# Patient Record
Sex: FEMALE | Race: WHITE | NOT HISPANIC OR LATINO | Employment: FULL TIME | URBAN - METROPOLITAN AREA
[De-identification: names, ages, dates, MRNs, and addresses within clinical notes are randomized per-mention and may not be internally consistent; named-entity substitution may affect disease eponyms.]

---

## 2017-05-11 ENCOUNTER — HOSPITAL ENCOUNTER (EMERGENCY)
Facility: CLINIC | Age: 22
Discharge: HOME | End: 2017-05-11
Attending: EMERGENCY MEDICINE | Admitting: EMERGENCY MEDICINE
Payer: COMMERCIAL

## 2017-05-11 VITALS
WEIGHT: 180 LBS | DIASTOLIC BLOOD PRESSURE: 63 MMHG | TEMPERATURE: 98.7 F | BODY MASS INDEX: 30.73 KG/M2 | HEIGHT: 64 IN | RESPIRATION RATE: 14 BRPM | OXYGEN SATURATION: 97 % | SYSTOLIC BLOOD PRESSURE: 132 MMHG

## 2017-05-11 DIAGNOSIS — S70.12XA TRAUMATIC ECCHYMOSIS OF LEFT THIGH, INITIAL ENCOUNTER: ICD-10-CM
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
## 2017-05-11 DIAGNOSIS — R58 MULTIPLE ECCHYMOSES OF BOTH UPPER ARMS: ICD-10-CM

## 2017-05-11 PROCEDURE — 99282 EMERGENCY DEPT VISIT SF MDM: CPT

## 2017-05-11 NOTE — ED AVS SNAPSHOT
Emergency Department    [address] [facility] [address]    Phone:  [phone]    Fax:  [phone]                                       Liz Agosto   MRN: [id number]    Department:   Emergency Department   Date of Visit:  5/11/2017           After Visit Summary Signature Page     I have received my discharge instructions, and my questions have been answered. I have discussed any challenges I see with this plan with the nurse or doctor.    ..........................................................................................................................................  Patient/Patient Representative Signature      ..........................................................................................................................................  Patient Representative Print Name and Relationship to Patient    ..................................................               ................................................  Date                                            Time    ..........................................................................................................................................  Reviewed by Signature/Title    ...................................................              ..............................................  Date                                                            Time
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
## 2017-05-12 NOTE — DISCHARGE INSTRUCTIONS
Avoid all contact sports or activities that could put you at risk for another head injury for at least 1 week.
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
## 2019-12-20 ENCOUNTER — APPOINTMENT (OUTPATIENT)
Dept: ULTRASOUND IMAGING | Facility: CLINIC | Age: 24
End: 2019-12-20
Attending: EMERGENCY MEDICINE
Payer: MEDICAID

## 2019-12-20 ENCOUNTER — HOSPITAL ENCOUNTER (EMERGENCY)
Facility: CLINIC | Age: 24
Discharge: HOME | End: 2019-12-20
Attending: EMERGENCY MEDICINE | Admitting: EMERGENCY MEDICINE
Payer: MEDICAID

## 2019-12-20 VITALS
OXYGEN SATURATION: 95 % | TEMPERATURE: 98.4 F | WEIGHT: 143 LBS | RESPIRATION RATE: 18 BRPM | BODY MASS INDEX: 23.82 KG/M2 | DIASTOLIC BLOOD PRESSURE: 55 MMHG | SYSTOLIC BLOOD PRESSURE: 116 MMHG | HEIGHT: 65 IN | HEART RATE: 69 BPM

## 2019-12-20 DIAGNOSIS — R42 DIZZINESS: ICD-10-CM

## 2019-12-20 LAB
ALBUMIN SERPL-MCNC: 3.9 G/DL (ref 3.4–5)
ALBUMIN UR-MCNC: NEGATIVE MG/DL
ALP SERPL-CCNC: 46 U/L (ref 40–150)
ALT SERPL W P-5'-P-CCNC: 15 U/L (ref 0–50)
ANION GAP SERPL CALCULATED.3IONS-SCNC: 5 MMOL/L (ref 3–14)
APPEARANCE UR: CLEAR
AST SERPL W P-5'-P-CCNC: 14 U/L (ref 0–45)
B-HCG SERPL-ACNC: ABNORMAL IU/L (ref 0–5)
BASOPHILS # BLD AUTO: 0 10E9/L (ref 0–0.2)
BASOPHILS NFR BLD AUTO: 0.3 %
BILIRUB SERPL-MCNC: 0.1 MG/DL (ref 0.2–1.3)
BILIRUB UR QL STRIP: NEGATIVE
BUN SERPL-MCNC: 7 MG/DL (ref 7–30)
CALCIUM SERPL-MCNC: 9.8 MG/DL (ref 8.5–10.1)
CHLORIDE SERPL-SCNC: 104 MMOL/L (ref 94–109)
CO2 SERPL-SCNC: 28 MMOL/L (ref 20–32)
COLOR UR AUTO: NORMAL
CREAT SERPL-MCNC: 0.67 MG/DL (ref 0.52–1.04)
DIFFERENTIAL METHOD BLD: ABNORMAL
EOSINOPHIL # BLD AUTO: 0 10E9/L (ref 0–0.7)
EOSINOPHIL NFR BLD AUTO: 0.4 %
ERYTHROCYTE [DISTWIDTH] IN BLOOD BY AUTOMATED COUNT: 12.2 % (ref 10–15)
GFR SERPL CREATININE-BSD FRML MDRD: >90 ML/MIN/{1.73_M2}
GLUCOSE SERPL-MCNC: 70 MG/DL (ref 70–99)
GLUCOSE UR STRIP-MCNC: NEGATIVE MG/DL
HCT VFR BLD AUTO: 34.8 % (ref 35–47)
HGB BLD-MCNC: 11.8 G/DL (ref 11.7–15.7)
HGB UR QL STRIP: NEGATIVE
IMM GRANULOCYTES # BLD: 0 10E9/L (ref 0–0.4)
IMM GRANULOCYTES NFR BLD: 0.1 %
INTERPRETATION ECG - MUSE: NORMAL
KETONES UR STRIP-MCNC: NEGATIVE MG/DL
LEUKOCYTE ESTERASE UR QL STRIP: NEGATIVE
LYMPHOCYTES # BLD AUTO: 1.7 10E9/L (ref 0.8–5.3)
LYMPHOCYTES NFR BLD AUTO: 24.4 %
MCH RBC QN AUTO: 30.6 PG (ref 26.5–33)
MCHC RBC AUTO-ENTMCNC: 33.9 G/DL (ref 31.5–36.5)
MCV RBC AUTO: 90 FL (ref 78–100)
MONOCYTES # BLD AUTO: 0.5 10E9/L (ref 0–1.3)
MONOCYTES NFR BLD AUTO: 7.6 %
NEUTROPHILS # BLD AUTO: 4.8 10E9/L (ref 1.6–8.3)
NEUTROPHILS NFR BLD AUTO: 67.2 %
NITRATE UR QL: NEGATIVE
NRBC # BLD AUTO: 0 10*3/UL
NRBC BLD AUTO-RTO: 0 /100
PH UR STRIP: 7 PH (ref 5–7)
PLATELET # BLD AUTO: 187 10E9/L (ref 150–450)
POTASSIUM SERPL-SCNC: 3.5 MMOL/L (ref 3.4–5.3)
PROT SERPL-MCNC: 7.4 G/DL (ref 6.8–8.8)
RBC # BLD AUTO: 3.86 10E12/L (ref 3.8–5.2)
SODIUM SERPL-SCNC: 137 MMOL/L (ref 133–144)
SOURCE: NORMAL
SP GR UR STRIP: 1 (ref 1–1.03)
UROBILINOGEN UR STRIP-MCNC: NORMAL MG/DL (ref 0–2)
WBC # BLD AUTO: 7.1 10E9/L (ref 4–11)

## 2019-12-20 PROCEDURE — 76801 OB US < 14 WKS SINGLE FETUS: CPT

## 2019-12-20 PROCEDURE — 85025 COMPLETE CBC W/AUTO DIFF WBC: CPT | Performed by: EMERGENCY MEDICINE

## 2019-12-20 PROCEDURE — 96360 HYDRATION IV INFUSION INIT: CPT

## 2019-12-20 PROCEDURE — 96361 HYDRATE IV INFUSION ADD-ON: CPT

## 2019-12-20 PROCEDURE — 84702 CHORIONIC GONADOTROPIN TEST: CPT | Performed by: EMERGENCY MEDICINE

## 2019-12-20 PROCEDURE — 25800030 ZZH RX IP 258 OP 636: Performed by: EMERGENCY MEDICINE

## 2019-12-20 PROCEDURE — 81003 URINALYSIS AUTO W/O SCOPE: CPT | Performed by: EMERGENCY MEDICINE

## 2019-12-20 PROCEDURE — 99285 EMERGENCY DEPT VISIT HI MDM: CPT | Mod: 25

## 2019-12-20 PROCEDURE — 80053 COMPREHEN METABOLIC PANEL: CPT | Performed by: EMERGENCY MEDICINE

## 2019-12-20 PROCEDURE — 93005 ELECTROCARDIOGRAM TRACING: CPT

## 2019-12-20 RX ADMIN — SODIUM CHLORIDE 1000 ML: 9 INJECTION, SOLUTION INTRAVENOUS at 18:47

## 2019-12-20 ASSESSMENT — ENCOUNTER SYMPTOMS
HEADACHES: 1
WEAKNESS: 1
ABDOMINAL PAIN: 0
VOMITING: 1
NUMBNESS: 0

## 2019-12-20 ASSESSMENT — MIFFLIN-ST. JEOR: SCORE: 1409.52

## 2019-12-20 NOTE — ED AVS SNAPSHOT
Emergency Department  [address] [place] [address]  Phone:  [phone]  Fax:  [phone]                                    Liz Agosto   MRN: [id number]    Department:   Emergency Department   Date of Visit:  12/20/2019           After Visit Summary Signature Page    I have received my discharge instructions, and my questions have been answered. I have discussed any challenges I see with this plan with the nurse or doctor.    ..........................................................................................................................................  Patient/Patient Representative Signature      ..........................................................................................................................................  Patient Representative Print Name and Relationship to Patient    ..................................................               ................................................  Date                                   Time    ..........................................................................................................................................  Reviewed by Signature/Title    ...................................................              ..............................................  Date                                               Time          22EPIC Rev 08/18

## 2019-12-20 NOTE — ED TRIAGE NOTES
Passed out approx 2 weeks ago in [place] while visiting family. 12 weeks pregnant. No other passing out episodes since being back but feels like going to pass out. Headache.

## 2019-12-21 NOTE — ED PROVIDER NOTES
History   Chief Complaint:  Lightheadedness     HPI   Liz Agosto is a 12-week pregnant, otherwise healthy  24 year old female who presents for evaluation of lightheadedness. The patient reports that two weeks ago while high in the [facility] she had a syncopal episode lasting 30-60 seconds. This episode did not have any associated shaking or seizure-like activity. She notes that at that time she had been exercising more but not drinking enough water or eating enough food. The patient has had several syncopal episodes in the past. Since the episode, the patient attests to feeling lightheaded and generally weak. These symptoms are exacerbated by standing at work and she has had to sit down more often. She has not had any additional episodes of syncope but is concerned due to the persisting faint feeling, prompting her evaluation this evening. In the ED, the patient further endorses feeling chilled yesterday, headaches, increased time sleeping, and one episode of vomiting daily. No abdominal pain or vaginal bleeding. She has not tried any medications for the symptoms but has been drinking plenty of water. The patient is currently pregnant and had an ultrasound performed four days ago. She has OB follow-up planned for the near future. The patient denies any numbness/tingling, abdominal pain, and vaginal bleeding.    Allergies:  No known drug allergies    Medications:   Prenatal vitamins    Past Medical History:    Depression with anxiety    Past Surgical History:    [place] teeth extraction    Family History:    History reviewed. No pertinent family history.     Social History:  Smoking status: Never smoker  Alcohol use: No    Review of Systems   Gastrointestinal: Positive for vomiting. Negative for abdominal pain.   Genitourinary: Negative for vaginal bleeding.   Neurological: Positive for syncope, weakness (generalized) and headaches. Negative for numbness.   All other systems reviewed and are  "negative.      Physical Exam   Patient Vitals for the past 24 hrs:   BP Temp Temp src Pulse Heart Rate Resp SpO2 Height Weight   12/20/19 1744 -- -- -- -- -- -- 95 % -- --   12/20/19 1733 116/55 98.4  F (36.9  C) Oral 69 69 18 -- 1.651 m (5' 5\") 64.9 kg (143 lb)     Physical Exam  General: Well appearing, nontoxic. Resting comfortably  Head:  Scalp, face, and head appear normal  Eyes:  Pupils are equal, round, and reactive to light, EOMI, no nystagmus     Conjunctivae non-injected and sclerae white  ENT:    The external nose is normal    Pinnae are normal    The oropharynx is normal, mucous membranes moist    Uvula is in the midline  Neck:  Normal range of motion    There is no rigidity noted    Trachea is in the midline  CV:  Regular rate and rhythm     Normal S1/S2, no S3/S4    No murmur or rub. Radial pulses 2+ bilaterally   Resp:  Lungs are clear and equal bilaterally    There is no tachypnea    No increased work of breathing    No rales, wheezing, or rhonchi  GI:  Abdomen is soft, no rigidity or guarding    No distension, or mass. Gravid uterus nonpalpable.    No tenderness or rebound tenderness   MS:  Normal muscular tone    Symmetric motor strength    No lower extremity edema  Skin:  No rash or acute skin lesions noted  Neuro: Awake and alert    Speech is normal and fluent    Moves all extremities spontaneously  Psych:  Normal affect.  Appropriate interactions.    Emergency Department Course   ECG (17:51:07):  Rate 76 bpm. VT interval 154. QRS duration 80. QT/QTc 380/427. P-R-T axes 66 72 48. Normal sinus rhythm with sinus arrhythmia. Possible Left atrial enlargement. Interpreted at 1903 by Meir Gabriel MD.    Laboratory:  Laboratory findings were communicated with the patient who voiced understanding of the findings.    CBC: WNL (WBC 7.1, HGB 11.8, )  CMP: Bilirubin 0.1 (L) o/w WNL (Creatinine 0.67)  hCG Quant: 40,062 (H)    UA reflex to Microscopic and Culture: negative    Interventions:  1847 NS " 1L IV Bolus    Emergency Department Course:  IV inserted and blood drawn. EKG obtained, results above.    Past medical records, nursing notes, and vitals reviewed.   1845 I performed an exam of the patient and obtained history, as documented above.    2103 I rechecked the patient. Explained findings to the patient.    Findings and plan explained to the patient. Patient discharged home with instructions regarding supportive care, medications, and reasons to return. The importance of close follow-up was reviewed.     Impression & Plan    Medical Decision Making:  Liz Agosto is a 24 year old female G1, P0 currently 12 weeks pregnant who presents with lightheadedness and history of syncope 2 weeks ago.  On my evaluation patient is well-appearing hemodynamically stable and afebrile.  She has no symptoms to suggest a primary infectious etiology.  EKG was obtained and was unremarkable.  No evidence of dysrhythmia or acute ischemia.  No evidence of right heart strain to suggest pulmonary embolism.  Patient is not tachycardic nor hypoxic which makes PE less likely.  Other etiologies related to her pregnancy are also considered including ectopic pregnancy, hemorrhage, underlying metabolic disturbance.  Patient notes she is having some difficulty with nausea and vomiting of pregnancy with daily vomiting.  Dehydration and poor oral intake is also considered as likely primary etiology.  Work-up in the emergency department is thankfully reassuring.  OB ultrasound reveals normal pregnancy without any concerning findings.  Urinalysis is negative for infection and ketones.  There is no anemia or significant electrolyte disturbance.  Patient felt improved after IV fluids.  She had no concerning findings to indicate the need for hospitalization.  I recommended discharged home with supportive care and good oral intake.  I recommended doxylamine/Unisom to help with her nausea and vomiting I also recommended close OB/GYN follow-up of  pregnancy.  Patient was agreeable to plan of care close return precautions were provided and patient was discharged in stable condition.    Diagnosis:    ICD-10-CM   1. Dizziness R42        Disposition:  Discharged to home    Discharge Medications:     Started    doxylamine 25 MG Tabs tablet  Commonly known as:  Unisom  Take 0.5 tab in the morning and 1 tab by mouth at bedtime as needed for nausea/vomiting of pregnancy.             12/20/2019   Rickie Maradiaga, am serving as a scribe at 6:45 PM on 12/20/2019 to document services personally performed by Meir Gabriel MD based on my observations and the provider's statements to me.      Meir Gabriel MD  12/21/19 1100

## 2019-12-22 ENCOUNTER — HOSPITAL ENCOUNTER (EMERGENCY)
Facility: CLINIC | Age: 24
Discharge: HOME | End: 2019-12-22
Attending: EMERGENCY MEDICINE | Admitting: EMERGENCY MEDICINE
Payer: MEDICAID

## 2019-12-22 ENCOUNTER — APPOINTMENT (OUTPATIENT)
Dept: ULTRASOUND IMAGING | Facility: CLINIC | Age: 24
End: 2019-12-22
Attending: EMERGENCY MEDICINE
Payer: MEDICAID

## 2019-12-22 VITALS
TEMPERATURE: 97.8 F | WEIGHT: 148 LBS | HEIGHT: 65 IN | RESPIRATION RATE: 18 BRPM | BODY MASS INDEX: 24.66 KG/M2 | HEART RATE: 69 BPM | DIASTOLIC BLOOD PRESSURE: 49 MMHG | OXYGEN SATURATION: 93 % | SYSTOLIC BLOOD PRESSURE: 95 MMHG

## 2019-12-22 DIAGNOSIS — O46.90 VAGINAL BLEEDING IN PREGNANCY: ICD-10-CM

## 2019-12-22 LAB
ABO + RH BLD: NORMAL
ABO + RH BLD: NORMAL
B-HCG FREE SERPL-ACNC: >2000 IU/L
B-HCG SERPL-ACNC: ABNORMAL IU/L (ref 0–5)
BASOPHILS # BLD AUTO: 0 10E9/L (ref 0–0.2)
BASOPHILS NFR BLD AUTO: 0.3 %
BLD GP AB SCN SERPL QL: NORMAL
BLOOD BANK CMNT PATIENT-IMP: NORMAL
DIFFERENTIAL METHOD BLD: ABNORMAL
EOSINOPHIL # BLD AUTO: 0 10E9/L (ref 0–0.7)
EOSINOPHIL NFR BLD AUTO: 0.4 %
ERYTHROCYTE [DISTWIDTH] IN BLOOD BY AUTOMATED COUNT: 12.2 % (ref 10–15)
HCT VFR BLD AUTO: 33.6 % (ref 35–47)
HGB BLD-MCNC: 11.3 G/DL (ref 11.7–15.7)
IMM GRANULOCYTES # BLD: 0 10E9/L (ref 0–0.4)
IMM GRANULOCYTES NFR BLD: 0.1 %
LYMPHOCYTES # BLD AUTO: 1.3 10E9/L (ref 0.8–5.3)
LYMPHOCYTES NFR BLD AUTO: 18.5 %
MCH RBC QN AUTO: 30.1 PG (ref 26.5–33)
MCHC RBC AUTO-ENTMCNC: 33.6 G/DL (ref 31.5–36.5)
MCV RBC AUTO: 90 FL (ref 78–100)
MONOCYTES # BLD AUTO: 0.6 10E9/L (ref 0–1.3)
MONOCYTES NFR BLD AUTO: 8.5 %
NEUTROPHILS # BLD AUTO: 4.9 10E9/L (ref 1.6–8.3)
NEUTROPHILS NFR BLD AUTO: 72.2 %
PLATELET # BLD AUTO: 174 10E9/L (ref 150–450)
RBC # BLD AUTO: 3.75 10E12/L (ref 3.8–5.2)
SPECIMEN EXP DATE BLD: NORMAL
WBC # BLD AUTO: 6.8 10E9/L (ref 4–11)

## 2019-12-22 PROCEDURE — 86901 BLOOD TYPING SEROLOGIC RH(D): CPT | Performed by: EMERGENCY MEDICINE

## 2019-12-22 PROCEDURE — 85025 COMPLETE CBC W/AUTO DIFF WBC: CPT | Performed by: EMERGENCY MEDICINE

## 2019-12-22 PROCEDURE — 84702 CHORIONIC GONADOTROPIN TEST: CPT

## 2019-12-22 PROCEDURE — 76801 OB US < 14 WKS SINGLE FETUS: CPT

## 2019-12-22 PROCEDURE — 86900 BLOOD TYPING SEROLOGIC ABO: CPT | Performed by: EMERGENCY MEDICINE

## 2019-12-22 PROCEDURE — 99284 EMERGENCY DEPT VISIT MOD MDM: CPT | Mod: 25

## 2019-12-22 PROCEDURE — 86850 RBC ANTIBODY SCREEN: CPT | Performed by: EMERGENCY MEDICINE

## 2019-12-22 PROCEDURE — 84702 CHORIONIC GONADOTROPIN TEST: CPT | Performed by: EMERGENCY MEDICINE

## 2019-12-22 ASSESSMENT — MIFFLIN-ST. JEOR: SCORE: 1432.2

## 2019-12-22 NOTE — ED PROVIDER NOTES
"  History     Chief Complaint:    Vaginal Bleeding      HPI   Liz Agosto is a 24 year old female who presents with vaginal bleeding. Patient states that she was here two days ago because her friends were worried about her after she hit her head. She states that she is currently pregnant, and two days ago she was told that everything looks okay. However, she is currently worried because, this morning, she started bleeding a lot. She notes that she doesn't feel intense cramping but states that something isn't right. She states that currently, the blood is a lot heavier than a period and soaks through pads. Furthermore, she states that she had prior bleeding when they inserted the vaginal probe for an ultrasound upon her initial pregnancy diagnosis. She notes that she just set up with an OB, and has an appointment in January. She denies experiencing any new trauma in the last couple days or dysuria. She also denies taking any medications. Of note, she states that her parents had a lot of miscarriages before her.     Allergies:  The patient has no known drug allergies.    Medications:    Lutera  Lexapro    Past Medical History:    Depression  Anxiety    Past Surgical History:    [place] teeth removal    Family History:    No past pertinent family history.    Social History:  The patient denies tobacco or alcohol use.    Marital Status:  Single [1]    Review of Systems   Genitourinary: Positive for vaginal bleeding.   All other systems reviewed and are negative.        Physical Exam   First Vitals:  BP: 117/55  Pulse: 77  Temp: 97.8  F (36.6  C)  Resp: 16  Height: 165.1 cm (5' 5\")  Weight: 67.1 kg (148 lb)  SpO2: 98 %      Physical Exam  General: Patient is alert and normal appearing.  HEENT: Head atraumatic    Eyes: pupils equal and reactive. Conjunctiva clear   Nares: patent   Oropharynx: no lesions, uvula midline, no palatal draping, normal voice, no trismus  Neck: Supple without lymphadenopathy, no " meningismus  Chest: Heart regular rate and rhythm.   Lungs: Equal clear to auscultation with no wheeze or rales  Abdomen: Soft, non tender, nondistended, normal bowel sounds  Back: No costovertebral angle tenderness, no midline C, T or L spine tenderness  Neuro: Grossly nonfocal, normal speech, strength equal bilaterally, CN 2-12 intact  Extremities: No deformities, equal radial and DP pulses. No clubbing, cyanosis.  No edema  Skin: Warm and dry with no rash.       Emergency Department Course     Imaging:  US OB, <14 weeks single:  IMPRESSION: Early single live intrauterine pregnancy of approximately  14 weeks 1 day gestation. as per radiology.     Laboratory:  CBC: WBC: 6.8, HGB: 11.3(L), PLT: 174    ABO/Rh type: A Pos, Antibody Screen: Neg    HCG Quantitative: 33,701 (H)    ISTAT HCG Quantitative: >2,000.0 (H)    Emergency Department Course:  Nursing notes and vitals reviewed. (4512) I performed an exam of the patient as documented above.     IV inserted. Blood drawn. This was sent to the lab for further testing, results above.     The patient was sent for a US OB, <14 weeks single while in the emergency department, findings above.     1505 I rechecked the patient and discussed the results of her workup thus far.     Findings and plan explained to the Patient. Patient discharged home with instructions regarding supportive care, medications, and reasons to return. The importance of close follow-up was reviewed.     I personally reviewed the laboratory results with the Patient and answered all related questions prior to discharge.     Impression & Plan      Medical Decision Making:  Liz Agosto is a 24 year old female who presents for evaluation of vaginal bleeding.  The workup here shows threatened miscarriage.  There is not a subchorionic hemorrhage.   I considered a broad differential including ovarian cyst, UTI, pyelonephritis, subchorionic hemorrhage, uterine bleeding, active miscarriage, constipation, etc.   More rare but serious etiologies considered included ectopic pregnancy, appendicitis, cholecystitis, volvulus, intraabdominal abscess, heterotopic pregnancy, etc.  In this patient, there are no signs of serious etiologies of abdominal pain.  Supportive outpatient management is therefore indicated.  Plan is home, close follow-up with OB, threatened miscarriage precautions, and return to ED for worsening pain, heavy vaginal bleeding (more than 1 pad soaked every hour).  Questions were answered.        Diagnosis:    ICD-10-CM    1. Vaginal bleeding in pregnancy O46.90        Disposition:  discharged to home    Discharge Medications:  New Prescriptions    No medications on file   Scribe Disclosure:  Galileo TRAORE, am serving as a scribe on 12/22/2019 at 2:02 PM to personally document services performed by Angela Toscano MD based on my observations and the provider's statements to me.     Galileo Elias  12/22/2019    EMERGENCY DEPARTMENT       Angela Toscano MD  12/22/19 [id number]

## 2019-12-22 NOTE — ED AVS SNAPSHOT
Emergency Department  [address] [place] [id number]  Phone:  [phone]  Fax:  [phone]                                    Liz Agosto   MRN: [id number]    Department:   Emergency Department   Date of Visit:  12/22/2019           After Visit Summary Signature Page    I have received my discharge instructions, and my questions have been answered. I have discussed any challenges I see with this plan with the nurse or doctor.    ..........................................................................................................................................  Patient/Patient Representative Signature      ..........................................................................................................................................  Patient Representative Print Name and Relationship to Patient    ..................................................               ................................................  Date                                   Time    ..........................................................................................................................................  Reviewed by Signature/Title    ...................................................              ..............................................  Date                                               Time          22EPIC Rev 08/18

## 2020-01-14 ENCOUNTER — PRENATAL OFFICE VISIT (OUTPATIENT)
Dept: OBGYN | Facility: CLINIC | Age: 25
End: 2020-01-14
Payer: MEDICAID

## 2020-01-14 ENCOUNTER — ANCILLARY PROCEDURE (OUTPATIENT)
Dept: ULTRASOUND IMAGING | Facility: CLINIC | Age: 25
End: 2020-01-14
Attending: ADVANCED PRACTICE MIDWIFE
Payer: MEDICAID

## 2020-01-14 ENCOUNTER — PRENATAL OFFICE VISIT (OUTPATIENT)
Dept: MIDWIFE SERVICES | Facility: CLINIC | Age: 25
End: 2020-01-14
Attending: ADVANCED PRACTICE MIDWIFE
Payer: MEDICAID

## 2020-01-14 VITALS — DIASTOLIC BLOOD PRESSURE: 58 MMHG | BODY MASS INDEX: 24.13 KG/M2 | SYSTOLIC BLOOD PRESSURE: 114 MMHG | WEIGHT: 145 LBS

## 2020-01-14 VITALS
SYSTOLIC BLOOD PRESSURE: 114 MMHG | WEIGHT: 148 LBS | TEMPERATURE: 98 F | DIASTOLIC BLOOD PRESSURE: 67 MMHG | BODY MASS INDEX: 24.63 KG/M2 | HEART RATE: 86 BPM

## 2020-01-14 DIAGNOSIS — O46.92 VAGINAL BLEEDING IN PREGNANCY, SECOND TRIMESTER: ICD-10-CM

## 2020-01-14 DIAGNOSIS — O46.92 VAGINAL BLEEDING IN PREGNANCY, SECOND TRIMESTER: Primary | ICD-10-CM

## 2020-01-14 DIAGNOSIS — Z34.92 PRENATAL CARE IN SECOND TRIMESTER: ICD-10-CM

## 2020-01-14 LAB
ABO + RH BLD: NORMAL
ABO + RH BLD: NORMAL
BLD GP AB SCN SERPL QL: NORMAL
BLOOD BANK CMNT PATIENT-IMP: NORMAL
ERYTHROCYTE [DISTWIDTH] IN BLOOD BY AUTOMATED COUNT: 12.5 % (ref 10–15)
HCT VFR BLD AUTO: 34.1 % (ref 35–47)
HGB BLD-MCNC: 11.2 G/DL (ref 11.7–15.7)
MCH RBC QN AUTO: 30.5 PG (ref 26.5–33)
MCHC RBC AUTO-ENTMCNC: 32.8 G/DL (ref 31.5–36.5)
MCV RBC AUTO: 93 FL (ref 78–100)
PLATELET # BLD AUTO: 191 10E9/L (ref 150–450)
RBC # BLD AUTO: 3.67 10E12/L (ref 3.8–5.2)
SPECIMEN EXP DATE BLD: NORMAL
WBC # BLD AUTO: 7.4 10E9/L (ref 4–11)

## 2020-01-14 PROCEDURE — 86901 BLOOD TYPING SEROLOGIC RH(D): CPT | Performed by: ADVANCED PRACTICE MIDWIFE

## 2020-01-14 PROCEDURE — 86900 BLOOD TYPING SEROLOGIC ABO: CPT | Performed by: ADVANCED PRACTICE MIDWIFE

## 2020-01-14 PROCEDURE — 99207 ZZC PRENATAL VISIT: CPT | Performed by: ADVANCED PRACTICE MIDWIFE

## 2020-01-14 PROCEDURE — 86780 TREPONEMA PALLIDUM: CPT | Performed by: ADVANCED PRACTICE MIDWIFE

## 2020-01-14 PROCEDURE — 86850 RBC ANTIBODY SCREEN: CPT | Performed by: ADVANCED PRACTICE MIDWIFE

## 2020-01-14 PROCEDURE — 85027 COMPLETE CBC AUTOMATED: CPT | Performed by: ADVANCED PRACTICE MIDWIFE

## 2020-01-14 PROCEDURE — G0499 HEPB SCREEN HIGH RISK INDIV: HCPCS | Performed by: ADVANCED PRACTICE MIDWIFE

## 2020-01-14 PROCEDURE — 87389 HIV-1 AG W/HIV-1&-2 AB AG IA: CPT | Performed by: ADVANCED PRACTICE MIDWIFE

## 2020-01-14 PROCEDURE — 87591 N.GONORRHOEAE DNA AMP PROB: CPT | Performed by: ADVANCED PRACTICE MIDWIFE

## 2020-01-14 PROCEDURE — 87086 URINE CULTURE/COLONY COUNT: CPT | Performed by: ADVANCED PRACTICE MIDWIFE

## 2020-01-14 PROCEDURE — 99207 ZZC FIRST OB VISIT: CPT | Performed by: ADVANCED PRACTICE MIDWIFE

## 2020-01-14 PROCEDURE — 36415 COLL VENOUS BLD VENIPUNCTURE: CPT | Performed by: ADVANCED PRACTICE MIDWIFE

## 2020-01-14 PROCEDURE — 76816 OB US FOLLOW-UP PER FETUS: CPT | Performed by: OBSTETRICS & GYNECOLOGY

## 2020-01-14 PROCEDURE — 87491 CHLMYD TRACH DNA AMP PROBE: CPT | Performed by: ADVANCED PRACTICE MIDWIFE

## 2020-01-14 NOTE — PROGRESS NOTES
is a  single   1 para 0  giving an EDC by Nasim's rule of 16 who presents for a new OB Visit.  She has had bleeding since her LMP.  She was seen at the ED 2 times.  She says bleeding is more with exercise and sex.  She has had mild nausea.. Weigh loss   has not occurred.. She denies fever, chills and viral infections since her last LMP.  FOB is present and attentive.    =========================================    INFECTION HISTORY  HIV: no  Hepatitis B: no  Hepatitis C: no  Tuberculosis: no   Herpes self: no  Herpes partner:  no  Chlamydia:  no  Gonorrhea:  no  HPV: no  BV:  no  Trichomonis:  no  Syphilis:  no  Chicken Pox:  no  ============================================    PERSONAL/SOCIAL HISTORY  Lives lives with their family.  Employment: Part time.  Her job involves light activity with little potential for toxic exposure.    =============================================    REVIEW OF SYSTEMS  CONSTITUTIONAL: Denies fever, chills and night sweats  DIET: Appetite is continue.  Eats a regular. Plans to gain 25-35 pounds with her pregnancy.  SKIN: Denies changes and suspicious moles or lesions.  ENT: Denies blurred vision, hearing loss, tinnitus, frequent colds, epistaxis, hoarseness and tooth pain.    ENDOCRINE: Denies heat and cold intolerance, and polydypsia.    BREASTS:  Tender since LMP.  Denies discharge and lumps.   HEART/LUNGS: Denies dyspnea, wheezing, coughing and chest pain.  HEMATOLOGIC: Denies tendency to bruise and history of blood clots.  GASTROINTESTINAL: Denies heartburn, indigestion and change of color in stools.    GENITOURINARY:  Denies urgency, dysuria and hematuria.  Has frequency of urination since LMP.   NEUROLOGIC:  Denies seizures, weakness and fainting.  PSYCHIATRIC:  Denies mood changes  ===========================================    PHYSICAL EXAM  GENERAL:   pleasant pregnant female, alert, cooperative and well groomed.  SKIN:  Warm and dry, without lesions or  tenderness.    HEAD: Symmetrical features.  EYES:  PERRLA  EARS: Tympanic membranes gray, translucent and intact.  NOSE: No flaring, patent  MOUTH:  Buccal mucosa pink, moist without lesions.  Teeth in good repair.    NECK:  Thyroid without enlargement and nodules.  Lymph nodes not palpable.   LUNGS:  Clear to auscultation.  BREAST:  Symmetrical without lesions or nodes.  Nipples everted.  Areolas symmetric.  No palpable axillary nodes.  HEART:  RRR without murmur.  ABDOMEN: Soft without masses or tenderness.  No CVA tenderness.  Uterus palpable at size equal to dates.  *  MUSCULOSKELETAL:  Full range of motion  GENITALIA: Pelvic exam deferred.  Plan pelvic exam with wet prep at main clinic due to bleeding.    RECTAL:  Normal appearance.  Digital exam deferred.  EXTREMITIES:  2+/2+ DTR, No edema. No significant varicosities.  ===================================================    PLAN:  Instructed on use of triage nurse line and contacting the on call CNM after hours in an emergency.    Discussed the indications, uses for and false positives for quad screen and fetal survey ultrasounds at 18-20 weeks gestation.  Will inform us at the next visit if she wished to avail herself of these screens.  US ordered - earliest possible to check on vaginal bleeding.    Plan to do wet prep at clinic to rule out vaginal infection for bleeding.   Will return to the clinic in 4 weeks for her next routine prenatal check.  Will call to be seen sooner if problem arise.  Oriented to CNM Service and added to Tandem List.  Fetal Survey US ordered.     Tita Gar, DNP, APRN, CNM

## 2020-01-14 NOTE — PROGRESS NOTES
17w0d  Liz is here with her partner to review results of her ultrasound for bleeding in the second trimester. She has had bleeding on and off for the last several weeks, and was seen in the ED on 12/20 & 12/22. The bleeding is worse after some activities, including spin class, other exercise or intercourse. The preliminary ultrasound report today showed area in RUQ of unfused membranes vs marginal abruption. MD recommends MFM referral to assess. They were asking if this could be caused by an ultrasound with a vaginal probe in early pregnancy, explained that we always assess early pregnancy with a vaginal probe. That there is not usually a definitive cause when this happens. Recommend that she present to the ER if bleeding gets worse or is accompanied by cramping. Also recommend pelvic rest until MFM can assess and give recommendations. Explained that the baby is growing well, so for now, appears that baby has good circulation. Also discussed midwifery vs MD care, as her partner was asking when they will start seeing doctors. Return to care as scheduled in two weeks.  Julien Hoffman CNM

## 2020-01-15 LAB
BACTERIA SPEC CULT: NORMAL
C TRACH DNA SPEC QL NAA+PROBE: NEGATIVE
HBV SURFACE AG SERPL QL IA: NONREACTIVE
HIV 1+2 AB+HIV1 P24 AG SERPL QL IA: NONREACTIVE
Lab: NORMAL
N GONORRHOEA DNA SPEC QL NAA+PROBE: NEGATIVE
SPECIMEN SOURCE: NORMAL

## 2020-01-17 LAB — T PALLIDUM AB SER QL: NONREACTIVE

## 2020-01-23 ENCOUNTER — TELEPHONE (OUTPATIENT)
Dept: MIDWIFE SERVICES | Facility: CLINIC | Age: 25
End: 2020-01-23

## 2020-01-23 ENCOUNTER — PRE VISIT (OUTPATIENT)
Dept: MATERNAL FETAL MEDICINE | Facility: CLINIC | Age: 25
End: 2020-01-23

## 2020-01-23 NOTE — TELEPHONE ENCOUNTER
Patient called back to cancel US and midwife appointment for  1/30. Patient wanted to be transferred to [facility] after she canceled appointment. Patient stated she will give us a call back to schedule appointment with the midwife provider after  she spoke with [facility] about possibly changing appointment.

## 2020-01-23 NOTE — TELEPHONE ENCOUNTER
MFM called after doing chart prep.  Liz has survey and MFM comp scheduled for same day.  Per chart, pt was scheduled for MFM after having bleeding, so pt should have survey and CNM cancelled and go to MFM instead.  Left message for pt to call back.  Will need to schedule CNM visit following MFM appointment.  Cancel clinic survey once pt has confirmed that she is going to MFM.  Magi Cronin RN

## 2020-01-30 ENCOUNTER — HOSPITAL ENCOUNTER (OUTPATIENT)
Dept: LAB | Facility: CLINIC | Age: 25
End: 2020-01-30
Attending: ADVANCED PRACTICE MIDWIFE
Payer: COMMERCIAL

## 2020-01-30 ENCOUNTER — OFFICE VISIT (OUTPATIENT)
Dept: MATERNAL FETAL MEDICINE | Facility: CLINIC | Age: 25
End: 2020-01-30
Attending: OBSTETRICS & GYNECOLOGY
Payer: COMMERCIAL

## 2020-01-30 ENCOUNTER — HOSPITAL ENCOUNTER (OUTPATIENT)
Dept: ULTRASOUND IMAGING | Facility: CLINIC | Age: 25
Discharge: HOME | End: 2020-01-30
Attending: ADVANCED PRACTICE MIDWIFE | Admitting: ADVANCED PRACTICE MIDWIFE
Payer: COMMERCIAL

## 2020-01-30 ENCOUNTER — OFFICE VISIT (OUTPATIENT)
Dept: MATERNAL FETAL MEDICINE | Facility: CLINIC | Age: 25
End: 2020-01-30
Attending: ADVANCED PRACTICE MIDWIFE
Payer: COMMERCIAL

## 2020-01-30 DIAGNOSIS — Z13.79 ENCOUNTER FOR GENETIC SCREENING: Primary | ICD-10-CM

## 2020-01-30 DIAGNOSIS — O26.90 PREGNANCY RELATED CONDITION, ANTEPARTUM: ICD-10-CM

## 2020-01-30 DIAGNOSIS — O46.90 VAGINAL BLEEDING DURING PREGNANCY: Primary | ICD-10-CM

## 2020-01-30 DIAGNOSIS — Z13.79 ENCOUNTER FOR GENETIC SCREENING: ICD-10-CM

## 2020-01-30 PROCEDURE — 81511 FTL CGEN ABNOR FOUR ANAL: CPT | Performed by: OBSTETRICS & GYNECOLOGY

## 2020-01-30 PROCEDURE — 76811 OB US DETAILED SNGL FETUS: CPT

## 2020-01-30 PROCEDURE — 36415 COLL VENOUS BLD VENIPUNCTURE: CPT | Performed by: OBSTETRICS & GYNECOLOGY

## 2020-01-30 PROCEDURE — 40000072 ZZH STATISTIC GENETIC COUNSELING, < 16 MIN: Mod: ZF | Performed by: GENETIC COUNSELOR, MS

## 2020-01-30 NOTE — PROGRESS NOTES
I met with Liz today at the request of Dr. Aida Flores. Liz completed a normal comprehensive ultrasound today at [facility] and is interested in pursuing genetic screening. We discussed the benefits and limitations of the Quad screen. She would like to proceed with this screening today. She will be informed of these results in approximately 5-7 days. She requests a detailed voice message be left at [phone] if she is unavailable.     Time spent: 15 minutes     Jonelle Youssef MS, [facility]  Maternal Fetal Medicine  [facility]  Ph: [phone]  [email or web address]

## 2020-01-30 NOTE — PROGRESS NOTES
Please see ultrasound report under imaging tab for details on ultrasound performed today.    Aida Flores MD  , OB/GYN  Maternal-Fetal Medicine  [email or web address]  [phone] (Academic office)  [phone] (Pager)

## 2020-02-01 DIAGNOSIS — O26.90 PREGNANCY RELATED CONDITION, ANTEPARTUM: Primary | ICD-10-CM

## 2020-02-07 ENCOUNTER — TELEPHONE (OUTPATIENT)
Dept: MATERNAL FETAL MEDICINE | Facility: CLINIC | Age: 25
End: 2020-02-07

## 2020-02-07 PROBLEM — Z34.00 SUPERVISION OF NORMAL FIRST PREGNANCY, ANTEPARTUM: Status: ACTIVE | Noted: 2020-02-07

## 2020-02-07 PROBLEM — Z23 NEED FOR TDAP VACCINATION: Status: ACTIVE | Noted: 2020-02-07

## 2020-02-07 NOTE — TELEPHONE ENCOUNTER
2/7/2020        Called Liz to discuss Quad screen results.  Results are screen negative for Down syndrome and trisomy 18 and open neural tube defects.  Post test risks are 1/54919 for Down syndrome, 1/45596 for Trisomy 18, and 1/3140 for open neural tube defects. This information was left in Liz's voicemail as requested and she was encouraged to reach out if she has any questions or concerns in the future.        Kurt Borges MS, [facility]  Licensed Genetic Counselor  Phone: [phone]  Pager: [phone]

## 2020-02-14 LAB
# FETUSES US: NORMAL
# FETUSES: 1
AFP ADJ MOM AMN: 1.47
AFP SERPL-MCNC: 77 NG/ML
AGE - REPORTED: 22.7 YR
CURRENT SMOKER: NO
CURRENT SMOKER: NO
DIABETES STATUS PATIENT: NO
FAMILY MEMBER DISEASES HX: NO
FAMILY MEMBER DISEASES HX: NO
GA METHOD: NORMAL
GA METHOD: NORMAL
GA: NORMAL WK
HCG MOM SERPL: 0.87
HCG SERPL-ACNC: NORMAL IU/L
HX OF HEREDITARY DISORDERS: NO
IDDM PATIENT QL: NO
INHIBIN A MOM SERPL: 0.56
INHIBIN A SERPL-MCNC: 95 PG/ML
INTEGRATED SCN PATIENT-IMP: NORMAL
IVF PREGNANCY: NORMAL
LMP START DATE: NORMAL
MONOCHORIONIC TWINS: NO
PATHOLOGY STUDY: NORMAL
PREV FETUS DEFECT: NO
SERVICE CMNT-IMP: NO
SPECIMEN DRAWN SERPL: NORMAL
U ESTRIOL MOM SERPL: 1.28
U ESTRIOL SERPL-MCNC: 2.74 NG/ML
VALPROIC/CARBAMAZEPINE STATUS: NO
WEIGHT UNITS: NORMAL

## 2020-03-10 ENCOUNTER — PRENATAL OFFICE VISIT (OUTPATIENT)
Dept: MIDWIFE SERVICES | Facility: CLINIC | Age: 25
End: 2020-03-10
Payer: COMMERCIAL

## 2020-03-10 ENCOUNTER — APPOINTMENT (OUTPATIENT)
Dept: LAB | Facility: CLINIC | Age: 25
End: 2020-03-10
Payer: COMMERCIAL

## 2020-03-10 VITALS
DIASTOLIC BLOOD PRESSURE: 77 MMHG | SYSTOLIC BLOOD PRESSURE: 125 MMHG | WEIGHT: 167 LBS | HEART RATE: 99 BPM | BODY MASS INDEX: 27.79 KG/M2

## 2020-03-10 DIAGNOSIS — Z34.00 SUPERVISION OF NORMAL FIRST PREGNANCY, ANTEPARTUM: Primary | ICD-10-CM

## 2020-03-10 DIAGNOSIS — Z23 NEED FOR TDAP VACCINATION: ICD-10-CM

## 2020-03-10 LAB
GLUCOSE 1H P 50 G GLC PO SERPL-MCNC: 60 MG/DL (ref 60–129)
HGB BLD-MCNC: 10.8 G/DL (ref 11.7–15.7)

## 2020-03-10 PROCEDURE — 82950 GLUCOSE TEST: CPT | Performed by: ADVANCED PRACTICE MIDWIFE

## 2020-03-10 PROCEDURE — 36415 COLL VENOUS BLD VENIPUNCTURE: CPT | Performed by: ADVANCED PRACTICE MIDWIFE

## 2020-03-10 PROCEDURE — 00000218 ZZHCL STATISTIC OBHBG - HEMOGLOBIN: Performed by: ADVANCED PRACTICE MIDWIFE

## 2020-03-10 PROCEDURE — 99207 ZZC PRENATAL VISIT: CPT | Performed by: ADVANCED PRACTICE MIDWIFE

## 2020-03-10 ASSESSMENT — PATIENT HEALTH QUESTIONNAIRE - PHQ9: SUM OF ALL RESPONSES TO PHQ QUESTIONS 1-9: 2

## 2020-03-10 NOTE — PROGRESS NOTES
Feeling well.  Baby is active. Denies any leaking of fluid, vaginal bleeding, regular uterine contractions, or headaches or other concerns.  Reviewed to call [phone] for contractions, loss of fluid, vaginal bleeding, decreased fetal movement or any other questions or concerns.    RTC in 4 weeks.  Tita Gar, GIBRAN, APRN, CNM

## 2020-04-09 ENCOUNTER — PRENATAL OFFICE VISIT (OUTPATIENT)
Dept: MIDWIFE SERVICES | Facility: CLINIC | Age: 25
End: 2020-04-09
Payer: COMMERCIAL

## 2020-04-09 DIAGNOSIS — Z34.02 ENCOUNTER FOR SUPERVISION OF NORMAL FIRST PREGNANCY IN SECOND TRIMESTER: Primary | ICD-10-CM

## 2020-04-09 PROCEDURE — 99207 ZZC PRENATAL VISIT: CPT | Performed by: ADVANCED PRACTICE MIDWIFE

## 2020-04-09 RX ORDER — FERROUS GLUCONATE 324(38)MG
324 TABLET ORAL
Qty: 60 TABLET | Refills: 1 | Status: ON HOLD | OUTPATIENT
Start: 2020-04-09 | End: 2020-04-25

## 2020-04-09 NOTE — Clinical Note
Will you look the ultrasound dating on this patient? Her earlier U/S's were ahead, but not enough to change her dates. Her fetal survey was 12 days ahead though, so should we change the date based on her fetal survey dating (with MFM and they did not make a recommendation), or do we keep the LMP dating because the earlier U/S's were consistent (although on the cusp being 7 days ahead).  Thanks for helping me think through this!  Julien

## 2020-04-09 NOTE — PROGRESS NOTES
"Subjective     Liz Agosto is a 24 year old female who is being evaluated via a billable telephone visit.      The patient has been notified of following:     \"This telephone visit will be conducted via a call between you and your physician/provider. We have found that certain health care needs can be provided without the need for a physical exam.  This service lets us provide the care you need with a short phone conversation.  If a prescription is necessary we can send it directly to your pharmacy.  If lab work is needed we can place an order for that and you can then stop by our lab to have the test done at a later time.    Telephone visits are billed at different rates depending on your insurance coverage. During this emergency period, for some insurers they may be billed the same as an in-person visit.  Please reach out to your insurance provider with any questions.    If during the course of the call the physician/provider feels a telephone visit is not appropriate, you will not be charged for this service.\"    Patient has given verbal consent for Telephone visit?  Yes    How would you like to obtain your AVS? MyChart    Liz Agosto complains of   Chief Complaint   Patient presents with     Prenatal Care       ALLERGIES  Patient has no known allergies.    TC to Liz. She is feeling very well. Discussed fetal kick counts, she usually gets 10 in 10 minutes. Baby is very active. Denies bleeding, leaking of fluid. Some headaches and back pain, has not taken anything, just used some heat and hydration. Headaches go away with hydration. Denies vision change, edema. Has some tingling in her legs, gets better with change of position. Recommend epsom salt baths before bed. Iron supplement sent. Discussed her dating, will have the group look at it to see if dates should be changed. Has in-person appt on 4/29/20.    Phone call duration:  20 minutes    Julien Hoffman CNM      "

## 2020-04-23 ENCOUNTER — TELEPHONE (OUTPATIENT)
Dept: MIDWIFE SERVICES | Facility: CLINIC | Age: 25
End: 2020-04-23

## 2020-04-23 ENCOUNTER — ANESTHESIA (OUTPATIENT)
Dept: OBGYN | Facility: CLINIC | Age: 25
End: 2020-04-23
Payer: COMMERCIAL

## 2020-04-23 ENCOUNTER — HOSPITAL ENCOUNTER (INPATIENT)
Facility: CLINIC | Age: 25
LOS: 4 days | Discharge: HOME HEALTH | End: 2020-04-27
Attending: OBSTETRICS & GYNECOLOGY | Admitting: OBSTETRICS & GYNECOLOGY
Payer: COMMERCIAL

## 2020-04-23 ENCOUNTER — HOSPITAL ENCOUNTER (EMERGENCY)
Facility: CLINIC | Age: 25
End: 2020-04-23
Payer: COMMERCIAL

## 2020-04-23 ENCOUNTER — ANESTHESIA EVENT (OUTPATIENT)
Dept: OBGYN | Facility: CLINIC | Age: 25
End: 2020-04-23
Payer: COMMERCIAL

## 2020-04-23 VITALS
TEMPERATURE: 97.7 F | SYSTOLIC BLOOD PRESSURE: 98 MMHG | HEART RATE: 57 BPM | DIASTOLIC BLOOD PRESSURE: 53 MMHG | OXYGEN SATURATION: 98 % | RESPIRATION RATE: 16 BRPM

## 2020-04-23 DIAGNOSIS — Z34.02 ENCOUNTER FOR SUPERVISION OF NORMAL FIRST PREGNANCY IN SECOND TRIMESTER: ICD-10-CM

## 2020-04-23 DIAGNOSIS — Z98.891 STATUS POST CESAREAN SECTION: Primary | ICD-10-CM

## 2020-04-23 PROBLEM — O60.00 PRETERM LABOR: Status: ACTIVE | Noted: 2020-04-23

## 2020-04-23 LAB
ABO + RH BLD: NORMAL
ABO + RH BLD: NORMAL
ALBUMIN UR-MCNC: 10 MG/DL
AMPHETAMINES UR QL SCN: NEGATIVE
APPEARANCE UR: ABNORMAL
BACTERIA #/AREA URNS HPF: ABNORMAL /HPF
BILIRUB UR QL STRIP: NEGATIVE
BLD GP AB SCN SERPL QL: NORMAL
BLOOD BANK CMNT PATIENT-IMP: NORMAL
CANNABINOIDS UR QL: NEGATIVE
COCAINE UR QL: NEGATIVE
COLOR UR AUTO: ABNORMAL
ERYTHROCYTE [DISTWIDTH] IN BLOOD BY AUTOMATED COUNT: 12.4 % (ref 10–15)
GLUCOSE UR STRIP-MCNC: 150 MG/DL
HCT VFR BLD AUTO: 35.3 % (ref 35–47)
HGB BLD-MCNC: 11.8 G/DL (ref 11.7–15.7)
HGB UR QL STRIP: ABNORMAL
KETONES UR STRIP-MCNC: NEGATIVE MG/DL
LEUKOCYTE ESTERASE UR QL STRIP: ABNORMAL
MCH RBC QN AUTO: 30.6 PG (ref 26.5–33)
MCHC RBC AUTO-ENTMCNC: 33.4 G/DL (ref 31.5–36.5)
MCV RBC AUTO: 92 FL (ref 78–100)
MUCOUS THREADS #/AREA URNS LPF: PRESENT /LPF
NITRATE UR QL: NEGATIVE
OPIATES UR QL SCN: NEGATIVE
PCP UR QL SCN: NEGATIVE
PH UR STRIP: 7.5 PH (ref 5–7)
PLATELET # BLD AUTO: 150 10E9/L (ref 150–450)
RBC # BLD AUTO: 3.86 10E12/L (ref 3.8–5.2)
RBC #/AREA URNS AUTO: 30 /HPF (ref 0–2)
SARS-COV-2 PCR COMMENT: NORMAL
SARS-COV-2 RNA SPEC QL NAA+PROBE: NEGATIVE
SARS-COV-2 RNA SPEC QL NAA+PROBE: NORMAL
SOURCE: ABNORMAL
SP GR UR STRIP: 1.01 (ref 1–1.03)
SPECIMEN EXP DATE BLD: NORMAL
SPECIMEN SOURCE: NORMAL
SPERM #/AREA URNS HPF: PRESENT /HPF
SQUAMOUS #/AREA URNS AUTO: 8 /HPF (ref 0–1)
UROBILINOGEN UR STRIP-MCNC: NORMAL MG/DL (ref 0–2)
WBC # BLD AUTO: 10.9 10E9/L (ref 4–11)
WBC #/AREA URNS AUTO: 38 /HPF (ref 0–5)
WET PREP SPEC: NORMAL

## 2020-04-23 PROCEDURE — 85027 COMPLETE CBC AUTOMATED: CPT | Performed by: ADVANCED PRACTICE MIDWIFE

## 2020-04-23 PROCEDURE — 87653 STREP B DNA AMP PROBE: CPT | Performed by: OBSTETRICS & GYNECOLOGY

## 2020-04-23 PROCEDURE — 86900 BLOOD TYPING SEROLOGIC ABO: CPT | Performed by: ADVANCED PRACTICE MIDWIFE

## 2020-04-23 PROCEDURE — 25000128 H RX IP 250 OP 636: Performed by: STUDENT IN AN ORGANIZED HEALTH CARE EDUCATION/TRAINING PROGRAM

## 2020-04-23 PROCEDURE — 87210 SMEAR WET MOUNT SALINE/INK: CPT | Performed by: OBSTETRICS & GYNECOLOGY

## 2020-04-23 PROCEDURE — 87591 N.GONORRHOEAE DNA AMP PROB: CPT | Performed by: OBSTETRICS & GYNECOLOGY

## 2020-04-23 PROCEDURE — 80307 DRUG TEST PRSMV CHEM ANLYZR: CPT | Performed by: ADVANCED PRACTICE MIDWIFE

## 2020-04-23 PROCEDURE — 81001 URINALYSIS AUTO W/SCOPE: CPT | Performed by: ADVANCED PRACTICE MIDWIFE

## 2020-04-23 PROCEDURE — 87491 CHLMYD TRACH DNA AMP PROBE: CPT | Performed by: OBSTETRICS & GYNECOLOGY

## 2020-04-23 PROCEDURE — 86850 RBC ANTIBODY SCREEN: CPT | Performed by: ADVANCED PRACTICE MIDWIFE

## 2020-04-23 PROCEDURE — 25000125 ZZHC RX 250: Performed by: STUDENT IN AN ORGANIZED HEALTH CARE EDUCATION/TRAINING PROGRAM

## 2020-04-23 PROCEDURE — 12000001 ZZH R&B MED SURG/OB UMMC

## 2020-04-23 PROCEDURE — G0463 HOSPITAL OUTPT CLINIC VISIT: HCPCS

## 2020-04-23 PROCEDURE — 87635 SARS-COV-2 COVID-19 AMP PRB: CPT | Performed by: STUDENT IN AN ORGANIZED HEALTH CARE EDUCATION/TRAINING PROGRAM

## 2020-04-23 PROCEDURE — 25800030 ZZH RX IP 258 OP 636: Performed by: STUDENT IN AN ORGANIZED HEALTH CARE EDUCATION/TRAINING PROGRAM

## 2020-04-23 PROCEDURE — 25800030 ZZH RX IP 258 OP 636

## 2020-04-23 PROCEDURE — 86780 TREPONEMA PALLIDUM: CPT | Performed by: ADVANCED PRACTICE MIDWIFE

## 2020-04-23 PROCEDURE — 3E0S3BZ INTRODUCTION OF ANESTHETIC AGENT INTO EPIDURAL SPACE, PERCUTANEOUS APPROACH: ICD-10-PCS | Performed by: ANESTHESIOLOGY

## 2020-04-23 PROCEDURE — 00HU33Z INSERTION OF INFUSION DEVICE INTO SPINAL CANAL, PERCUTANEOUS APPROACH: ICD-10-PCS | Performed by: ANESTHESIOLOGY

## 2020-04-23 PROCEDURE — 87086 URINE CULTURE/COLONY COUNT: CPT | Performed by: OBSTETRICS & GYNECOLOGY

## 2020-04-23 PROCEDURE — 86901 BLOOD TYPING SEROLOGIC RH(D): CPT | Performed by: ADVANCED PRACTICE MIDWIFE

## 2020-04-23 RX ORDER — ACETAMINOPHEN 325 MG/1
650 TABLET ORAL EVERY 4 HOURS PRN
Status: DISCONTINUED | OUTPATIENT
Start: 2020-04-23 | End: 2020-04-24

## 2020-04-23 RX ORDER — LIDOCAINE HYDROCHLORIDE 10 MG/ML
INJECTION, SOLUTION EPIDURAL; INFILTRATION; INTRACAUDAL; PERINEURAL
Status: DISCONTINUED
Start: 2020-04-23 | End: 2020-04-23 | Stop reason: HOSPADM

## 2020-04-23 RX ORDER — OXYTOCIN/0.9 % SODIUM CHLORIDE 30/500 ML
100-340 PLASTIC BAG, INJECTION (ML) INTRAVENOUS CONTINUOUS PRN
Status: COMPLETED | OUTPATIENT
Start: 2020-04-23 | End: 2020-04-24

## 2020-04-23 RX ORDER — OXYTOCIN/0.9 % SODIUM CHLORIDE 30/500 ML
PLASTIC BAG, INJECTION (ML) INTRAVENOUS
Status: DISCONTINUED
Start: 2020-04-23 | End: 2020-04-23 | Stop reason: HOSPADM

## 2020-04-23 RX ORDER — EPHEDRINE SULFATE 50 MG/ML
5 INJECTION, SOLUTION INTRAMUSCULAR; INTRAVENOUS; SUBCUTANEOUS
Status: DISCONTINUED | OUTPATIENT
Start: 2020-04-23 | End: 2020-04-24

## 2020-04-23 RX ORDER — NALBUPHINE HYDROCHLORIDE 20 MG/ML
2.5-5 INJECTION, SOLUTION INTRAMUSCULAR; INTRAVENOUS; SUBCUTANEOUS EVERY 6 HOURS PRN
Status: DISCONTINUED | OUTPATIENT
Start: 2020-04-23 | End: 2020-04-24

## 2020-04-23 RX ORDER — CARBOPROST TROMETHAMINE 250 UG/ML
250 INJECTION, SOLUTION INTRAMUSCULAR
Status: DISCONTINUED | OUTPATIENT
Start: 2020-04-23 | End: 2020-04-24

## 2020-04-23 RX ORDER — PENICILLIN G POTASSIUM 5000000 [IU]/1
5 INJECTION, POWDER, FOR SOLUTION INTRAMUSCULAR; INTRAVENOUS ONCE
Status: COMPLETED | OUTPATIENT
Start: 2020-04-23 | End: 2020-04-23

## 2020-04-23 RX ORDER — IBUPROFEN 800 MG/1
800 TABLET, FILM COATED ORAL
Status: DISCONTINUED | OUTPATIENT
Start: 2020-04-23 | End: 2020-04-24

## 2020-04-23 RX ORDER — METOCLOPRAMIDE HYDROCHLORIDE 5 MG/ML
10 INJECTION INTRAMUSCULAR; INTRAVENOUS EVERY 6 HOURS PRN
Status: DISCONTINUED | OUTPATIENT
Start: 2020-04-23 | End: 2020-04-24

## 2020-04-23 RX ORDER — MAGNESIUM SULFATE IN WATER 40 MG/ML
2 INJECTION, SOLUTION INTRAVENOUS CONTINUOUS
Status: DISCONTINUED | OUTPATIENT
Start: 2020-04-23 | End: 2020-04-24

## 2020-04-23 RX ORDER — FENTANYL CITRATE 50 UG/ML
INJECTION, SOLUTION INTRAMUSCULAR; INTRAVENOUS PRN
Status: DISCONTINUED | OUTPATIENT
Start: 2020-04-23 | End: 2020-04-24

## 2020-04-23 RX ORDER — NALOXONE HYDROCHLORIDE 0.4 MG/ML
.1-.4 INJECTION, SOLUTION INTRAMUSCULAR; INTRAVENOUS; SUBCUTANEOUS
Status: DISCONTINUED | OUTPATIENT
Start: 2020-04-23 | End: 2020-04-24

## 2020-04-23 RX ORDER — SODIUM CHLORIDE, SODIUM LACTATE, POTASSIUM CHLORIDE, CALCIUM CHLORIDE 600; 310; 30; 20 MG/100ML; MG/100ML; MG/100ML; MG/100ML
INJECTION, SOLUTION INTRAVENOUS CONTINUOUS
Status: DISCONTINUED | OUTPATIENT
Start: 2020-04-23 | End: 2020-04-24

## 2020-04-23 RX ORDER — FENTANYL/BUPIVACAINE/NS/PF 2-1250MCG
PLASTIC BAG, INJECTION (ML) INJECTION CONTINUOUS PRN
Status: DISCONTINUED | OUTPATIENT
Start: 2020-04-23 | End: 2020-04-24

## 2020-04-23 RX ORDER — PROCHLORPERAZINE 25 MG
25 SUPPOSITORY, RECTAL RECTAL EVERY 12 HOURS PRN
Status: DISCONTINUED | OUTPATIENT
Start: 2020-04-23 | End: 2020-04-24

## 2020-04-23 RX ORDER — ONDANSETRON 2 MG/ML
4 INJECTION INTRAMUSCULAR; INTRAVENOUS EVERY 6 HOURS PRN
Status: DISCONTINUED | OUTPATIENT
Start: 2020-04-23 | End: 2020-04-24

## 2020-04-23 RX ORDER — METHYLERGONOVINE MALEATE 0.2 MG/ML
200 INJECTION INTRAVENOUS
Status: DISCONTINUED | OUTPATIENT
Start: 2020-04-23 | End: 2020-04-24

## 2020-04-23 RX ORDER — OXYCODONE AND ACETAMINOPHEN 5; 325 MG/1; MG/1
1 TABLET ORAL
Status: DISCONTINUED | OUTPATIENT
Start: 2020-04-23 | End: 2020-04-24

## 2020-04-23 RX ORDER — BETAMETHASONE SODIUM PHOSPHATE AND BETAMETHASONE ACETATE 3; 3 MG/ML; MG/ML
12 INJECTION, SUSPENSION INTRA-ARTICULAR; INTRALESIONAL; INTRAMUSCULAR; SOFT TISSUE EVERY 24 HOURS
Status: COMPLETED | OUTPATIENT
Start: 2020-04-23 | End: 2020-04-24

## 2020-04-23 RX ORDER — LIDOCAINE HYDROCHLORIDE AND EPINEPHRINE 15; 5 MG/ML; UG/ML
INJECTION, SOLUTION EPIDURAL PRN
Status: DISCONTINUED | OUTPATIENT
Start: 2020-04-23 | End: 2020-04-24

## 2020-04-23 RX ORDER — MISOPROSTOL 200 UG/1
TABLET ORAL
Status: DISCONTINUED
Start: 2020-04-23 | End: 2020-04-23 | Stop reason: HOSPADM

## 2020-04-23 RX ORDER — SODIUM CHLORIDE, SODIUM LACTATE, POTASSIUM CHLORIDE, CALCIUM CHLORIDE 600; 310; 30; 20 MG/100ML; MG/100ML; MG/100ML; MG/100ML
INJECTION, SOLUTION INTRAVENOUS
Status: COMPLETED
Start: 2020-04-23 | End: 2020-04-23

## 2020-04-23 RX ORDER — OXYTOCIN 10 [USP'U]/ML
INJECTION, SOLUTION INTRAMUSCULAR; INTRAVENOUS
Status: DISCONTINUED
Start: 2020-04-23 | End: 2020-04-23 | Stop reason: HOSPADM

## 2020-04-23 RX ORDER — OXYTOCIN 10 [USP'U]/ML
10 INJECTION, SOLUTION INTRAMUSCULAR; INTRAVENOUS
Status: DISCONTINUED | OUTPATIENT
Start: 2020-04-23 | End: 2020-04-24

## 2020-04-23 RX ORDER — MAGNESIUM SULFATE HEPTAHYDRATE 40 MG/ML
INJECTION, SOLUTION INTRAVENOUS
Status: DISCONTINUED
Start: 2020-04-23 | End: 2020-04-23 | Stop reason: WASHOUT

## 2020-04-23 RX ORDER — CITRIC ACID/SODIUM CITRATE 334-500MG
30 SOLUTION, ORAL ORAL ONCE
Status: COMPLETED | OUTPATIENT
Start: 2020-04-23 | End: 2020-04-24

## 2020-04-23 RX ORDER — CALCIUM GLUCONATE 94 MG/ML
1 INJECTION, SOLUTION INTRAVENOUS
Status: DISCONTINUED | OUTPATIENT
Start: 2020-04-23 | End: 2020-04-24

## 2020-04-23 RX ADMIN — Medication 2.5 MILLION UNITS: at 20:16

## 2020-04-23 RX ADMIN — LIDOCAINE HYDROCHLORIDE,EPINEPHRINE BITARTRATE 3 ML: 15; .005 INJECTION, SOLUTION EPIDURAL; INFILTRATION; INTRACAUDAL; PERINEURAL at 13:08

## 2020-04-23 RX ADMIN — PENICILLIN G POTASSIUM 5 MILLION UNITS: 5000000 POWDER, FOR SOLUTION INTRAMUSCULAR; INTRAPLEURAL; INTRATHECAL; INTRAVENOUS at 12:26

## 2020-04-23 RX ADMIN — Medication 12 ML/HR: at 17:56

## 2020-04-23 RX ADMIN — Medication 12 ML/HR: at 22:06

## 2020-04-23 RX ADMIN — Medication 12 ML/HR: at 13:15

## 2020-04-23 RX ADMIN — SODIUM CHLORIDE, POTASSIUM CHLORIDE, SODIUM LACTATE AND CALCIUM CHLORIDE: 600; 310; 30; 20 INJECTION, SOLUTION INTRAVENOUS at 22:30

## 2020-04-23 RX ADMIN — SODIUM CHLORIDE, POTASSIUM CHLORIDE, SODIUM LACTATE AND CALCIUM CHLORIDE 1000 ML: 600; 310; 30; 20 INJECTION, SOLUTION INTRAVENOUS at 12:01

## 2020-04-23 RX ADMIN — Medication 2.5 MILLION UNITS: at 16:16

## 2020-04-23 RX ADMIN — BETAMETHASONE SODIUM PHOSPHATE AND BETAMETHASONE ACETATE 12 MG: 3; 3 INJECTION, SUSPENSION INTRA-ARTICULAR; INTRALESIONAL; INTRAMUSCULAR at 12:42

## 2020-04-23 RX ADMIN — FENTANYL CITRATE 25 MCG: 50 INJECTION, SOLUTION INTRAMUSCULAR; INTRAVENOUS at 13:08

## 2020-04-23 RX ADMIN — MAGNESIUM SULFATE HEPTAHYDRATE 6 G: 500 INJECTION, SOLUTION INTRAMUSCULAR; INTRAVENOUS at 12:04

## 2020-04-23 RX ADMIN — MAGNESIUM SULFATE HEPTAHYDRATE 2 G/HR: 40 INJECTION, SOLUTION INTRAVENOUS at 12:39

## 2020-04-23 RX ADMIN — Medication: at 13:15

## 2020-04-23 NOTE — PROGRESS NOTES
Full H&P to follow.    Briefly, 24 year old  at 31w2d who presented with contractions, found to be complete with BBOW.    Given magnesium for neuroprotection, PCN for unknown GBS, and BMTZ for fetal lung maturity.  S/p NICU consult.    Would like epidural.  Given contractions seem to be spacing with mag, will try.  Anesthesia notified.    Considered nifedipine for tocolysis given early gestation, but don't suspect her BP will tolerate and it's unlikely to hold her off given complete dilation.    BSUS done by Dr. Zambrano, fetus cephalic.    Aida Correia MD

## 2020-04-23 NOTE — PLAN OF CARE
Labor Shift Note  Data: Contraction pattern  q4-5 . Comfortable with epidural. Fully dilated since admission. Fetal assessment Reactive, Category 2. Betamethasone 1st dose given on 20 @1245  Magnesium Sulfate for neuro-protection started  @1230  Interventions: Continue uterine/fetal assessment continuous. Activity level:Bed rest, and preventive measures including Medications. Encourage active range of motion and frequent position changes.  Plan: Continue expectant management. Room set up for . NICU aware and spoke with pt briefly. Observe for and notify care provider of indications of progressing labor or signs of fetal/maternal compromise. Continue mag as ordered. Repeat 2nd Beta tomorrow if pt still in labor.

## 2020-04-23 NOTE — PROGRESS NOTES
Patient comfortable with epidural.  GBS obtained.  Will cont magnesium and pcn as ordered.  If stable, would repeat BMTZ in 24 hours, but discussed with patient this is unlikely given advanced dilation.    FHT cat 2 due to occ variable decels.  135/mod/+ accels, occ variable decel  [place]:  Not tracing well, but appears q3-6min    Aida Correia MD

## 2020-04-23 NOTE — TELEPHONE ENCOUNTER
31w2d  Talked with patient, states cramping for about 1 hour states coming and going.  Had some bleeding 2 days ago with minimal yesterday and none today.    Somewhat holding breath when talking sounds uncomfortable, has been drinking and eating.  Patient told [facility] come [facility] for [facility] of  labor.  Kirstie Figueroa CNM

## 2020-04-23 NOTE — PLAN OF CARE
[facility] at [facility] with c/o contractions/cramping since 0900. Pt had some contractions last night but went away. Denies bleeding or leaking currently. Kirstie Figueroa CNM at bedside for evaluation. Sterile spec performed. Only thing seen was BBOW. GC/Chylam and wet prep collected. Urine sent. SVE only felt BBOW, no cervix. Transferred to 481. NICU called. Labs drawn with IV start. Report given to Lois Mccauley RN at 1205.

## 2020-04-23 NOTE — H&P
[facility]  OB History and Physical      Liz Agosto    MRN# [id number]  YOB: 1995      CC:  contractions    HPI:  Ms. Liz Agosto is a 24 year old  at 31w2d by LMP consistent with 14w US, who presents with contractions.  She reports painful contractions since this morning.  She denies leakage of fluids.  She denies vaginal bleeding.  She had intercourse in the last day.  She had spotting after that.  She has had bleeding in pregnancy intermittenly.    Prenatal Labs:   Lab Results   Component Value Date    ABO A 2020    RH Pos 2020    AS Neg 2020    HEPBANG Nonreactive 2020    CHPCRT Negative 2020    GCPCRT Negative 2020    HGB 10.8 (L) 03/10/2020       GBS Status:   pending    OB History  OB History    Para Term  AB Living   1 0 0 0 0 0   SAB TAB Ectopic Multiple Live Births   0 0 0 0 0      # Outcome Date GA Lbr Alfie/2nd Weight Sex Delivery Anes PTL Lv   1 Current              PMHx:   History reviewed. No pertinent past medical history.  PSHx:   History reviewed. No pertinent surgical history.  Meds:   Medications Prior to Admission   Medication Sig Dispense Refill Last Dose     Prenatal Vit-Fe Fumarate-FA (PRENATAL MULTIVITAMIN  PLUS IRON) 27-1 MG TABS Take 1 tablet by mouth daily   2020 at Unknown time     doxylamine (UNISOM) 25 MG TABS tablet Take 0.5 tab in the morning and 1 tab by mouth at bedtime as needed for nausea/vomiting of pregnancy. (Patient not taking: Reported on 2020) 30 tablet 0      ferrous gluconate (FERGON) 324 (38 Fe) MG tablet Take 1 tablet (324 mg) by mouth daily (with breakfast) 60 tablet 1 Unknown at Unknown time     Allergies:  No Known Allergies   FmHx: History reviewed. No pertinent family history.  SocHx: She denies any tobacco, alcohol, or other drug use during this pregnancy.    ROS:   Complete 10-point ROS negative except as noted in HPI. She denies headache, blurry vision, chest  pain, shortness of breath, RUQ pain, nausea, vomiting, dysuria, hematuria or extremity edema.    PE:  Vit:   Patient Vitals for the past 4 hrs:   Temp Temp [facility]   20 1215 99.3  F (37.4  C) Oral      Gen: Well-appearing, NAD, comfortable  Abd: Soft, gravid, non-tender  Ext: no LE edema b/l  Cx: Completely dilated with membranes intact per Dr. Correia    Pres:  cephalic by bedside US           FHT: Baseline 140, moderate variability, + accelerations, intermittent variable decelerations   [place]: 3 contractions in 10 minutes      Assessment  Ms. Lzi Agosto is a 24 year old , at 31w2d, who presents with painful contractions, in florid  labor but with no evidence of infection and overall reassuring maternal and fetal status.    Plan  -  Labor   - Admit to L&D for  labor. Expectant management.  During period of evaulation evaluation membranes remained intact and there was minimal descent.   - Pain management: Desires epidural for analgesia   - Anticipate progression   - GBS unknown, ppx indicated due to prematurity    -Fetal Well Being   - Category II FHT. Reactive and reassuring   - Cephalic by BSUS.   - Continue EFM and Brock   - will give betamethasone to promote fetal lung maturity given that she has not progressed and delivery possible >6h    - PNC:    - Rh positive. Rubella to be drawn in AM.     - Placenta: anterior    The patient was discussed with Dr. Correia who is in agreement with the treatment plan.    Julita Jacob MD  PGY-4 OB/GYN  [phone] (OB G3 Pager) / [phone] (OB G2 Pager)

## 2020-04-23 NOTE — TELEPHONE ENCOUNTER
Pt is 31w2d, calling this morning to discuss what she thinks are contractions.  She had some menstrual cramping yesterday but then went away.  This morning, she's feeling a lot of tightness/menstrual cramps.  She has been drinking water and resting, but they're not going away.  Do you want to call her to talk more about it?  Pt sounds uncomfortable.  Magi Cronin RN

## 2020-04-23 NOTE — PROGRESS NOTES
Labor Progress Note    S:  Patient feeling intermittent pressure but stable.    O:   Patient Vitals for the past 12 hrs:   BP Temp Temp src SpO2   20 1349 116/66 -- -- 100 %   20 1344 117/62 -- -- 100 %   20 1339 102/51 -- -- 100 %   20 1335 120/57 -- -- --   20 1329 110/60 -- -- 100 %   20 1327 109/58 -- -- --   20 1325 108/57 -- -- --   20 1323 107/57 -- -- --   20 1321 97/53 -- -- --   20 1315 96/54 98.3  F (36.8  C) Oral --   20 1313 107/54 -- -- --   20 1300 99/57 -- -- 100 %   20 1250 99/54 -- -- --   20 1233 98/55 -- -- --   20 1228 101/59 -- -- --   20 1223 97/55 -- -- --   20 1215 -- 99.3  F (37.4  C) Oral --     SVE: Completely dilated, bulging membranes at +1 station    FHT: Baseline 140, moderate variability, + accelerations, intermittent variable decelerations  [place]: contractions every ~5 minutes    A/P:  Ms. Liz Agosto is a 24 year old  at 31w2d, admitted for  labor, completely dilated but stable.    1. Labor: progressed spontaneously, continue to monitor  2. Rh positive  3. GBS pending, ppx running  4. Fetus: Category II FHT, continue EFM and tocometry.   S/p BMZ x1  5. Prenatal hx:  Rubella unknown.    Dr. Correia performed exam.    Julita Jacob MD  OBGYN PGY-4  [phone] (OB PGY3 Pager)  2020  2:22 PM

## 2020-04-23 NOTE — PROGRESS NOTES
31w2d  S;  Patient called this morning cramping started at 9am denies bleeding or leaking of fluid, patient sounded uncomfortable on phone and was instructed to come to [facility]    [facility] at [facility] in Triage 3 and placed on EFM.  2 contractions noted   O:   FHT's 140's.   Speculum exam visualize bulging bag of water,   Gc/chlamydia and wet prep obtained.    SVE;  Bulging bag and  No cervix palpated with gentle check.     P:   Dr. Correia notified transfer of care to MD.  Patient transferred to room 481  Dr. Correia in delivery   Dr. Zambrano into room to assess fetal position with Ultrasound and confirmed vertex.  Dr. Correia in room.  Kirstie Figueroa CNM

## 2020-04-23 NOTE — PROGRESS NOTES
Strip Review Note    O:   Patient Vitals for the past 12 hrs:   BP Temp Temp src Resp SpO2   20 1725 110/69 -- -- -- 97 %   20 1715 102/64 -- -- -- 98 %   20 1615 (!) 87/53 98.6  F (37  C) Oral 16 97 %   20 1545 105/55 -- -- -- 98 %   20 1520 111/55 -- -- -- 100 %   20 1450 97/51 -- -- -- 100 %   20 1349 116/66 -- -- -- 100 %   20 1344 117/62 -- -- -- 100 %   20 1339 102/51 -- -- -- 100 %   20 1335 120/57 -- -- -- --   20 1329 110/60 -- -- -- 100 %   20 1327 109/58 -- -- -- --   20 1325 108/57 -- -- -- --   20 1323 107/57 -- -- -- --   20 1321 97/53 -- -- -- --   20 1315 96/54 98.3  F (36.8  C) Oral -- --   20 1313 107/54 -- -- -- --   20 1300 99/57 -- -- -- 100 %   20 1250 99/54 -- -- -- --   20 1233 98/55 -- -- -- --   20 1228 101/59 -- -- -- --   20 1223 97/55 -- -- -- --   20 1215 -- 99.3  F (37.4  C) Oral -- --     FHT: Baseline 134, minimal to moderate variability, + accelerations, intermittent variable decelerations  [place]: contractions every ~5 minutes    A/P:  Ms. Liz Agosto is a 24 year old  at 31w2d, admitted for  labor, completely dilated but stable.    1.  Labor:   - Progressed spontaneously, continue to monitor.  - Neuroprotection: IV mag 6g load at 1204. Continue 2g/hr maintenance.  - BMZ x1 given 20 1242.  - GBS unknown. PCN loading dose was given at 1226.  - S/p epidural for pain control.  2. Rh positive  3. GBS pending, ppx running  4. Fetus: Category II FHT, continue EFM and tocometry.   S/p BMZ x1  5. Prenatal hx:  Rubella unknown.    Reshma Justice MD ([email or web address])  N OBGYN PGY-2  Personal pager: [phone]  2020 5:45 PM

## 2020-04-23 NOTE — ANESTHESIA PROCEDURE NOTES
Combined Spinal/Epidural procedure note    Staff -   Anesthesiologist:  Itz Valverde MD  Resident/Fellow: Viki Magdaleno MD    Performed By: resident  Procedure performed by resident/CRNA in presence of a teaching physician.        Location:  OB  Procedure start time:  4/23/2020 1:00 PM  Procedure end time:  4/23/2020 1:15 PM    Timeout  patient identified, IV checked, site marked, risks and benefits discussed, informed consent, monitors and equipment checked, pre-op evaluation, at physician/surgeon's request and post-op pain management    Correct Patient: Yes    Correct Position: Yes    Correct Site: Yes    Correct Procedure: Yes    Correct Laterality:  Yes  Site Marked:  Yes    Procedure details  Procedure:  Combined Spinal/Epidural (CSE)  Position:  Sitting  ASA:  2  Sterile Prep: chloraprep    Local skin infiltration:  1% lidocaine  amount (mL):  3  Needle gauge (G):  17  Needle length (in):  3.5  Injection Technique:  LORT saline  Attempts:  1  Spinal Needle (G):  25  Spinal Needle Type:  Maryuri  Spinal Needle Length (in):  5  Catheter gauge (G):  19  Catheter threaded easily: Yes    Threaded to skin (cm) :  11  Threaded in epidural space (cm):  5.5  Paresthesias:  No  CSF with Epidural needle: No    CSF with Spinal needle: Yes    Aspiration negative for Heme or CSF: Yes    Test dose (mL):  3  Local anesthetic for test dose:  Lidocaine 1.5% w/ 1:200,000 epinephrine  Test dose time:  13:08  Test dose negative for signs of intravascular, subdural or intrathecal injection: Yes

## 2020-04-24 ENCOUNTER — ANCILLARY PROCEDURE (OUTPATIENT)
Dept: ULTRASOUND IMAGING | Facility: CLINIC | Age: 25
End: 2020-04-24
Attending: ANESTHESIOLOGY
Payer: COMMERCIAL

## 2020-04-24 PROBLEM — Z98.891 STATUS POST CESAREAN SECTION: Status: ACTIVE | Noted: 2020-04-24

## 2020-04-24 LAB
BACTERIA SPEC CULT: NORMAL
C TRACH DNA SPEC QL NAA+PROBE: NEGATIVE
GP B STREP DNA SPEC QL NAA+PROBE: NEGATIVE
HGB BLD-MCNC: 10.4 G/DL (ref 11.7–15.7)
Lab: NORMAL
N GONORRHOEA DNA SPEC QL NAA+PROBE: NEGATIVE
RUBV IGG SERPL IA-ACNC: 15 IU/ML
SPECIMEN SOURCE: NORMAL
T PALLIDUM AB SER QL: NONREACTIVE

## 2020-04-24 PROCEDURE — 25800030 ZZH RX IP 258 OP 636: Performed by: STUDENT IN AN ORGANIZED HEALTH CARE EDUCATION/TRAINING PROGRAM

## 2020-04-24 PROCEDURE — 36415 COLL VENOUS BLD VENIPUNCTURE: CPT | Performed by: STUDENT IN AN ORGANIZED HEALTH CARE EDUCATION/TRAINING PROGRAM

## 2020-04-24 PROCEDURE — 25000128 H RX IP 250 OP 636: Performed by: STUDENT IN AN ORGANIZED HEALTH CARE EDUCATION/TRAINING PROGRAM

## 2020-04-24 PROCEDURE — 25000125 ZZHC RX 250: Performed by: STUDENT IN AN ORGANIZED HEALTH CARE EDUCATION/TRAINING PROGRAM

## 2020-04-24 PROCEDURE — 36000059 ZZH SURGERY LEVEL 3 EA 15 ADDTL MIN UMMC: Performed by: OBSTETRICS & GYNECOLOGY

## 2020-04-24 PROCEDURE — 10907ZC DRAINAGE OF AMNIOTIC FLUID, THERAPEUTIC FROM PRODUCTS OF CONCEPTION, VIA NATURAL OR ARTIFICIAL OPENING: ICD-10-PCS | Performed by: OBSTETRICS & GYNECOLOGY

## 2020-04-24 PROCEDURE — 88307 TISSUE EXAM BY PATHOLOGIST: CPT | Performed by: STUDENT IN AN ORGANIZED HEALTH CARE EDUCATION/TRAINING PROGRAM

## 2020-04-24 PROCEDURE — 12000001 ZZH R&B MED SURG/OB UMMC

## 2020-04-24 PROCEDURE — C1765 ADHESION BARRIER: HCPCS | Performed by: OBSTETRICS & GYNECOLOGY

## 2020-04-24 PROCEDURE — 85018 HEMOGLOBIN: CPT | Performed by: STUDENT IN AN ORGANIZED HEALTH CARE EDUCATION/TRAINING PROGRAM

## 2020-04-24 PROCEDURE — 27210794 ZZH OR GENERAL SUPPLY STERILE: Performed by: OBSTETRICS & GYNECOLOGY

## 2020-04-24 PROCEDURE — 59510 CESAREAN DELIVERY: CPT | Mod: GC | Performed by: OBSTETRICS & GYNECOLOGY

## 2020-04-24 PROCEDURE — 99232 SBSQ HOSP IP/OBS MODERATE 35: CPT | Performed by: NURSE PRACTITIONER

## 2020-04-24 PROCEDURE — 71000014 ZZH RECOVERY PHASE 1 LEVEL 2 FIRST HR: Performed by: OBSTETRICS & GYNECOLOGY

## 2020-04-24 PROCEDURE — 25000128 H RX IP 250 OP 636: Performed by: ANESTHESIOLOGY

## 2020-04-24 PROCEDURE — 25000132 ZZH RX MED GY IP 250 OP 250 PS 637: Performed by: STUDENT IN AN ORGANIZED HEALTH CARE EDUCATION/TRAINING PROGRAM

## 2020-04-24 PROCEDURE — 27110028 ZZH OR GENERAL SUPPLY NON-STERILE: Performed by: OBSTETRICS & GYNECOLOGY

## 2020-04-24 PROCEDURE — 36000057 ZZH SURGERY LEVEL 3 1ST 30 MIN - UMMC: Performed by: OBSTETRICS & GYNECOLOGY

## 2020-04-24 PROCEDURE — 37000008 ZZH ANESTHESIA TECHNICAL FEE, 1ST 30 MIN: Performed by: OBSTETRICS & GYNECOLOGY

## 2020-04-24 PROCEDURE — 86762 RUBELLA ANTIBODY: CPT | Performed by: STUDENT IN AN ORGANIZED HEALTH CARE EDUCATION/TRAINING PROGRAM

## 2020-04-24 PROCEDURE — C9290 INJ, BUPIVACAINE LIPOSOME: HCPCS | Performed by: STUDENT IN AN ORGANIZED HEALTH CARE EDUCATION/TRAINING PROGRAM

## 2020-04-24 PROCEDURE — 40000170 ZZH STATISTIC PRE-PROCEDURE ASSESSMENT II: Performed by: OBSTETRICS & GYNECOLOGY

## 2020-04-24 PROCEDURE — 37000009 ZZH ANESTHESIA TECHNICAL FEE, EACH ADDTL 15 MIN: Performed by: OBSTETRICS & GYNECOLOGY

## 2020-04-24 RX ORDER — HYDROCORTISONE 2.5 %
CREAM (GRAM) TOPICAL 3 TIMES DAILY PRN
Status: DISCONTINUED | OUTPATIENT
Start: 2020-04-24 | End: 2020-04-27 | Stop reason: HOSPADM

## 2020-04-24 RX ORDER — MORPHINE SULFATE 1 MG/ML
INJECTION, SOLUTION EPIDURAL; INTRATHECAL; INTRAVENOUS PRN
Status: DISCONTINUED | OUTPATIENT
Start: 2020-04-24 | End: 2020-04-24

## 2020-04-24 RX ORDER — AMOXICILLIN 250 MG
2 CAPSULE ORAL 2 TIMES DAILY
Status: DISCONTINUED | OUTPATIENT
Start: 2020-04-24 | End: 2020-04-27 | Stop reason: HOSPADM

## 2020-04-24 RX ORDER — CEFAZOLIN SODIUM 1 G/3ML
1 INJECTION, POWDER, FOR SOLUTION INTRAMUSCULAR; INTRAVENOUS SEE ADMIN INSTRUCTIONS
Status: DISCONTINUED | OUTPATIENT
Start: 2020-04-24 | End: 2020-04-24 | Stop reason: HOSPADM

## 2020-04-24 RX ORDER — LIDOCAINE 40 MG/G
CREAM TOPICAL
Status: DISCONTINUED | OUTPATIENT
Start: 2020-04-24 | End: 2020-04-27 | Stop reason: HOSPADM

## 2020-04-24 RX ORDER — OXYCODONE HYDROCHLORIDE 5 MG/1
5 TABLET ORAL EVERY 4 HOURS PRN
Status: DISCONTINUED | OUTPATIENT
Start: 2020-04-24 | End: 2020-04-27 | Stop reason: HOSPADM

## 2020-04-24 RX ORDER — CITRIC ACID/SODIUM CITRATE 334-500MG
30 SOLUTION, ORAL ORAL
Status: DISCONTINUED | OUTPATIENT
Start: 2020-04-24 | End: 2020-04-24 | Stop reason: HOSPADM

## 2020-04-24 RX ORDER — KETOROLAC TROMETHAMINE 30 MG/ML
30 INJECTION, SOLUTION INTRAMUSCULAR; INTRAVENOUS EVERY 6 HOURS
Status: COMPLETED | OUTPATIENT
Start: 2020-04-24 | End: 2020-04-25

## 2020-04-24 RX ORDER — BUPIVACAINE HYDROCHLORIDE 2.5 MG/ML
INJECTION, SOLUTION EPIDURAL; INFILTRATION; INTRACAUDAL PRN
Status: DISCONTINUED | OUTPATIENT
Start: 2020-04-24 | End: 2020-04-24

## 2020-04-24 RX ORDER — CEFAZOLIN SODIUM 2 G/100ML
2 INJECTION, SOLUTION INTRAVENOUS
Status: COMPLETED | OUTPATIENT
Start: 2020-04-24 | End: 2020-04-24

## 2020-04-24 RX ORDER — ACYCLOVIR 50 MG/G
OINTMENT TOPICAL
Status: DISCONTINUED | OUTPATIENT
Start: 2020-04-24 | End: 2020-04-24

## 2020-04-24 RX ORDER — SODIUM CHLORIDE, SODIUM LACTATE, POTASSIUM CHLORIDE, CALCIUM CHLORIDE 600; 310; 30; 20 MG/100ML; MG/100ML; MG/100ML; MG/100ML
INJECTION, SOLUTION INTRAVENOUS CONTINUOUS
Status: DISCONTINUED | OUTPATIENT
Start: 2020-04-24 | End: 2020-04-24 | Stop reason: HOSPADM

## 2020-04-24 RX ORDER — ONDANSETRON 2 MG/ML
4 INJECTION INTRAMUSCULAR; INTRAVENOUS EVERY 6 HOURS PRN
Status: DISCONTINUED | OUTPATIENT
Start: 2020-04-24 | End: 2020-04-27 | Stop reason: HOSPADM

## 2020-04-24 RX ORDER — ONDANSETRON 2 MG/ML
INJECTION INTRAMUSCULAR; INTRAVENOUS PRN
Status: DISCONTINUED | OUTPATIENT
Start: 2020-04-24 | End: 2020-04-24

## 2020-04-24 RX ORDER — AMOXICILLIN 250 MG
1 CAPSULE ORAL 2 TIMES DAILY
Status: DISCONTINUED | OUTPATIENT
Start: 2020-04-24 | End: 2020-04-27 | Stop reason: HOSPADM

## 2020-04-24 RX ORDER — MODIFIED LANOLIN
OINTMENT (GRAM) TOPICAL
Status: DISCONTINUED | OUTPATIENT
Start: 2020-04-24 | End: 2020-04-27 | Stop reason: HOSPADM

## 2020-04-24 RX ORDER — NALOXONE HYDROCHLORIDE 0.4 MG/ML
.1-.4 INJECTION, SOLUTION INTRAMUSCULAR; INTRAVENOUS; SUBCUTANEOUS
Status: DISCONTINUED | OUTPATIENT
Start: 2020-04-24 | End: 2020-04-27 | Stop reason: HOSPADM

## 2020-04-24 RX ORDER — LIDOCAINE HYDROCHLORIDE 20 MG/ML
INJECTION, SOLUTION EPIDURAL; INFILTRATION; INTRACAUDAL; PERINEURAL PRN
Status: DISCONTINUED | OUTPATIENT
Start: 2020-04-24 | End: 2020-04-24

## 2020-04-24 RX ORDER — BISACODYL 10 MG
10 SUPPOSITORY, RECTAL RECTAL DAILY PRN
Status: DISCONTINUED | OUTPATIENT
Start: 2020-04-26 | End: 2020-04-27 | Stop reason: HOSPADM

## 2020-04-24 RX ORDER — OXYTOCIN 10 [USP'U]/ML
10 INJECTION, SOLUTION INTRAMUSCULAR; INTRAVENOUS
Status: DISCONTINUED | OUTPATIENT
Start: 2020-04-24 | End: 2020-04-27 | Stop reason: HOSPADM

## 2020-04-24 RX ORDER — SIMETHICONE 80 MG
80 TABLET,CHEWABLE ORAL 4 TIMES DAILY PRN
Status: DISCONTINUED | OUTPATIENT
Start: 2020-04-24 | End: 2020-04-27 | Stop reason: HOSPADM

## 2020-04-24 RX ORDER — OXYTOCIN/0.9 % SODIUM CHLORIDE 30/500 ML
100 PLASTIC BAG, INJECTION (ML) INTRAVENOUS CONTINUOUS
Status: DISCONTINUED | OUTPATIENT
Start: 2020-04-24 | End: 2020-04-27 | Stop reason: HOSPADM

## 2020-04-24 RX ORDER — ACETAMINOPHEN 325 MG/1
975 TABLET ORAL EVERY 6 HOURS
Status: DISCONTINUED | OUTPATIENT
Start: 2020-04-24 | End: 2020-04-27 | Stop reason: HOSPADM

## 2020-04-24 RX ORDER — IBUPROFEN 800 MG/1
800 TABLET, FILM COATED ORAL EVERY 6 HOURS
Status: DISCONTINUED | OUTPATIENT
Start: 2020-04-25 | End: 2020-04-27 | Stop reason: HOSPADM

## 2020-04-24 RX ORDER — OXYTOCIN/0.9 % SODIUM CHLORIDE 30/500 ML
340 PLASTIC BAG, INJECTION (ML) INTRAVENOUS CONTINUOUS PRN
Status: DISCONTINUED | OUTPATIENT
Start: 2020-04-24 | End: 2020-04-27 | Stop reason: HOSPADM

## 2020-04-24 RX ORDER — DEXTROSE, SODIUM CHLORIDE, SODIUM LACTATE, POTASSIUM CHLORIDE, AND CALCIUM CHLORIDE 5; .6; .31; .03; .02 G/100ML; G/100ML; G/100ML; G/100ML; G/100ML
INJECTION, SOLUTION INTRAVENOUS CONTINUOUS
Status: DISCONTINUED | OUTPATIENT
Start: 2020-04-24 | End: 2020-04-27 | Stop reason: HOSPADM

## 2020-04-24 RX ORDER — PHENYLEPHRINE HCL IN 0.9% NACL 1 MG/10 ML
SYRINGE (ML) INTRAVENOUS CONTINUOUS PRN
Status: DISCONTINUED | OUTPATIENT
Start: 2020-04-24 | End: 2020-04-24

## 2020-04-24 RX ORDER — AZITHROMYCIN 500 MG/5ML
500 INJECTION, POWDER, LYOPHILIZED, FOR SOLUTION INTRAVENOUS
Status: COMPLETED | OUTPATIENT
Start: 2020-04-24 | End: 2020-04-24

## 2020-04-24 RX ADMIN — Medication 2 G: at 14:22

## 2020-04-24 RX ADMIN — Medication 100 ML/HR: at 18:36

## 2020-04-24 RX ADMIN — ACETAMINOPHEN 975 MG: 325 TABLET, FILM COATED ORAL at 20:26

## 2020-04-24 RX ADMIN — ONDANSETRON 4 MG: 2 INJECTION INTRAMUSCULAR; INTRAVENOUS at 14:38

## 2020-04-24 RX ADMIN — FENTANYL CITRATE 100 MCG: 50 INJECTION, SOLUTION INTRAMUSCULAR; INTRAVENOUS at 10:46

## 2020-04-24 RX ADMIN — SODIUM CHLORIDE, POTASSIUM CHLORIDE, SODIUM LACTATE AND CALCIUM CHLORIDE: 600; 310; 30; 20 INJECTION, SOLUTION INTRAVENOUS at 14:26

## 2020-04-24 RX ADMIN — SODIUM CITRATE AND CITRIC ACID MONOHYDRATE 30 ML: 500; 334 SOLUTION ORAL at 14:11

## 2020-04-24 RX ADMIN — Medication 2.5 MILLION UNITS: at 00:28

## 2020-04-24 RX ADMIN — MAGNESIUM SULFATE HEPTAHYDRATE 2 G/HR: 40 INJECTION, SOLUTION INTRAVENOUS at 07:33

## 2020-04-24 RX ADMIN — Medication: at 09:16

## 2020-04-24 RX ADMIN — LIDOCAINE HYDROCHLORIDE 6 ML: 20 INJECTION, SOLUTION EPIDURAL; INFILTRATION; INTRACAUDAL; PERINEURAL at 14:22

## 2020-04-24 RX ADMIN — LIDOCAINE HYDROCHLORIDE 10 ML: 20 INJECTION, SOLUTION EPIDURAL; INFILTRATION; INTRACAUDAL; PERINEURAL at 14:10

## 2020-04-24 RX ADMIN — OXYTOCIN-SODIUM CHLORIDE 0.9% IV SOLN 30 UNIT/500ML 300 ML/HR: 30-0.9/5 SOLUTION at 14:30

## 2020-04-24 RX ADMIN — BETAMETHASONE SODIUM PHOSPHATE AND BETAMETHASONE ACETATE 12 MG: 3; 3 INJECTION, SUSPENSION INTRA-ARTICULAR; INTRALESIONAL; INTRAMUSCULAR at 10:28

## 2020-04-24 RX ADMIN — LIDOCAINE HYDROCHLORIDE 5 ML: 20 INJECTION, SOLUTION EPIDURAL; INFILTRATION; INTRACAUDAL; PERINEURAL at 10:46

## 2020-04-24 RX ADMIN — MORPHINE SULFATE 4 MG: 1 INJECTION, SOLUTION EPIDURAL; INTRATHECAL; INTRAVENOUS at 14:53

## 2020-04-24 RX ADMIN — KETOROLAC TROMETHAMINE 30 MG: 30 INJECTION, SOLUTION INTRAMUSCULAR at 22:40

## 2020-04-24 RX ADMIN — BUPIVACAINE HYDROCHLORIDE 20 ML: 2.5 INJECTION, SOLUTION EPIDURAL; INFILTRATION; INTRACAUDAL at 15:13

## 2020-04-24 RX ADMIN — Medication 2.5 MILLION UNITS: at 08:21

## 2020-04-24 RX ADMIN — FENTANYL CITRATE 100 MCG: 50 INJECTION, SOLUTION INTRAMUSCULAR; INTRAVENOUS at 14:10

## 2020-04-24 RX ADMIN — ACYCLOVIR: 50 OINTMENT TOPICAL at 08:28

## 2020-04-24 RX ADMIN — Medication: at 03:28

## 2020-04-24 RX ADMIN — SODIUM CHLORIDE, POTASSIUM CHLORIDE, SODIUM LACTATE AND CALCIUM CHLORIDE: 600; 310; 30; 20 INJECTION, SOLUTION INTRAVENOUS at 08:20

## 2020-04-24 RX ADMIN — Medication 50 MCG/MIN: at 14:28

## 2020-04-24 RX ADMIN — SODIUM CHLORIDE, POTASSIUM CHLORIDE, SODIUM LACTATE AND CALCIUM CHLORIDE 500 ML: 600; 310; 30; 20 INJECTION, SOLUTION INTRAVENOUS at 13:42

## 2020-04-24 RX ADMIN — Medication 2.5 MILLION UNITS: at 04:33

## 2020-04-24 RX ADMIN — Medication 500 MG: at 14:25

## 2020-04-24 RX ADMIN — SENNOSIDES AND DOCUSATE SODIUM 2 TABLET: 8.6; 5 TABLET ORAL at 20:26

## 2020-04-24 RX ADMIN — BUPIVACAINE 20 ML: 13.3 INJECTION, SUSPENSION, LIPOSOMAL INFILTRATION at 15:13

## 2020-04-24 RX ADMIN — Medication 2.5 MILLION UNITS: at 12:30

## 2020-04-24 RX ADMIN — KETOROLAC TROMETHAMINE 30 MG: 30 INJECTION, SOLUTION INTRAMUSCULAR at 16:33

## 2020-04-24 NOTE — ANESTHESIA PROCEDURE NOTES
Peripheral Nerve Block Procedure Note  Staff -   Anesthesiologist:  Ann Reaves MD  Resident/Fellow: Viki Magdaleno MD    Performed By: resident  Procedure performed by resident/CRNA in presence of a teaching physician.        Location: OB  Procedure Start/Stop TImes:     patient identified, IV checked, site marked, risks and benefits discussed, informed consent, monitors and equipment checked, pre-op evaluation, at physician/surgeon's request and post-op pain management      Correct Patient: Yes      Correct Position: Yes      Correct Site: Yes      Correct Procedure: Yes      Correct Laterality:  Yes    Site Marked:  Yes  Procedure details:     Procedure:  TAP    Diagnosis:  Post operative pain    Laterality:  Bilateral    Position:  Supine    Sterile Prep: chloraprep, mask and sterile gloves      Local skin infiltration:  None    Needle:  Short bevel    Needle gauge:  21    Needle length (mm):  110    Ultrasound: Yes      Ultrasound used to identify targeted nerve, plexus, or vascular structure and placed a needle adjacent to it      Permanent Image entered into patiient's record      Abnormal pain on injection: No      Blood Aspirated: No      Paresthesias:  No    Bleeding at site: No      Bolus via:  Needle    Infusion Method:  Single Shot    Complications:  None  Assessment/Narrative:     Injection made incrementally with aspirations every (mL):  5

## 2020-04-24 NOTE — PROGRESS NOTES
Patient began having deep variable decelerations after AROM as well as significant vaginal bleeding. Attempt at pushing over 4 contractions with minimal descent. FHT with minimal variability and deep variable decelerations. Clinical picture concerning for placental abruption, plan urgent  section. The risks, benefits, and alternatives of  section were discussed, including the risks of bleeding, infection, injury to surrounding organs, fetal injury, and remote risks of hysterectomy. She consented to a blood transfusion in the event of a life threatening amount of bleeding. She had time to ask questions and agreed to proceed. Surgical consent was signed.    Petra Richards MD  Ob/Gyn PGY-3  20 2:10 PM    Category 2 tracing after AROM with vaginal bleeding and bloody fluid c/w abruption. Had patient attempt to push a few times with little fetal movement so recc c/s delivery.   Probable abruption caused pt to become complete and with AROM abruption was revealed as uterine cavity decompressed.    Maddy Vanegas MD

## 2020-04-24 NOTE — PLAN OF CARE
Patient VSS, epidural infusing and patient states she is feeling pain on her left side.  Patient turned to left side and PCA button pushed by patient. Patient states her pain has decreased since changing sides and herself a bolus.    Magnesium at 50 ml/hr 2 grams/hr.  Patient tolerating Magnesium well.  FHT's baseline 135, moderate variability, accelerations present and intermittent variables noted.  FHT's are appropriate for gestational age and reactive NST noted.   Contractions hard to , toco readjusted several times.  Contractions palpate moderate to strong.  Plan of care reviewed with patient and partner, understanding verbalized.  Continue with current plan of care.

## 2020-04-24 NOTE — PLAN OF CARE
VSS, afebrile. Denies leaking of fluid. Some bloody show noted. Feeling fairly comfortable with epidural, pushing PCA button intermittently. Continuing to labor with magnesium for NP. Partner at bedside for support. Questions encouraged and answered, continue with plan of care.

## 2020-04-24 NOTE — PROGRESS NOTES
"Received order for  to see regarding anticipated premature birth and baby's NICU admission.    [facility]  MATERNAL CHILD HEALTH   SOCIAL WORK PROGRESS NOTE      DATA:   Met with patient prior to delivery to assess needs and to offer support.      Patient is 24 year-old Liz Agosto.  Her partner and FOB is Chris Flanagan.  They are not legally  but have been in a relationship for a little over one year.  They live together in [place], [place]. This is Liz's first baby.  They know they are expecting a baby boy and have chosen the name \"Rickie Flanagan\".      Chris has a 3 1/2 year-old son (also named Chris) from a previous relationship.  He spends time with Chris and Liz every Thursday-Sunday. Chris's mom and other extended family are caring for him this weekend while Chris and Liz are here.      Liz was employed with [employer] until a recent furlough due to covid-19.  She now looks forward to time at home with baby Rickie.  Chris is a  for [employer].  He is offered a 4 month, paid parental leave.      Chris has [employer] insurance through [employer] and baby Rickie will be added to this policy.  Liz anticipates she will bring baby to [facility] in [place], [place] for care upon discharge.  They have all essential baby supplies for Rickie.        INTERVENTION:   Brief psychosocial assessment completed.    Provided supportive counseling related to Liz's impending pre-term delivery and baby's anticipated NICU admission.    Shared information about the NICU at [facility].  Liz and Chris are very much interested in having Rickie transferred there, when medically appropriate.     ASSESSMENT:   Liz is groggy from the Magnesium and breathing through contractions.  Her energies are focused on her labor and bringing her baby into the world.   Chris is excited to meet his new son.      Family psychosocial " situation appears stable.  No unmet needs or concerns are identified.     PLAN:   Will follow along throughout Rickie's NICU admission for needs and for support.       NICHOL Bustamante [facility]  Clinical   Maternal Child Health  Phone:  [phone]  Pager:  [phone]

## 2020-04-24 NOTE — PLAN OF CARE
Magnesioum Sulfte discontinued per Dr Vanegas. NNP by to speak and consult with patient and significant other. Pt reporting increased pain, anesthesia by to do a bolus, patient reporting decreased pain after bolus.

## 2020-04-24 NOTE — DISCHARGE SUMMARY
[facility] Discharge Summary    Liz Agosto MRN# [id number]   Age: 24 year old YOB: 1995     Date of Admission:  2020  Date of Discharge::  20   Admitting Physician:  Aida Correia MD  Discharge Physician:  Kelsie Macario MD             Admission Diagnoses:   Intrauterine pregnancy at 31w3d   labor           Discharge Diagnosis:     Same, delivered   Suspected abruption  Asymptomatic acute anemia of surgical blood loss          Procedures:     Procedure(s): Primary low-transverse  section with double layer uterine closure via Pfannenstiel incision  TAP block  Epidural                Medications Prior to Admission:     Medications Prior to Admission   Medication Sig Dispense Refill Last Dose     Prenatal Vit-Fe Fumarate-FA (PRENATAL MULTIVITAMIN  PLUS IRON) 27-1 MG TABS Take 1 tablet by mouth daily   2020 at Unknown time     [DISCONTINUED] doxylamine (UNISOM) 25 MG TABS tablet Take 0.5 tab in the morning and 1 tab by mouth at bedtime as needed for nausea/vomiting of pregnancy. (Patient not taking: Reported on 2020) 30 tablet 0      [DISCONTINUED] ferrous gluconate (FERGON) 324 (38 Fe) MG tablet Take 1 tablet (324 mg) by mouth daily (with breakfast) 60 tablet 1 Unknown at Unknown time             Discharge Medications:        Review of your medicines      START taking      Dose / Directions   acetaminophen 325 MG tablet  Commonly known as:  TYLENOL      Dose:  650 mg  Take 2 tablets (650 mg) by mouth every 6 hours as needed for mild pain Start after Delivery.  Quantity:  100 tablet  Refills:  0     ibuprofen 600 MG tablet  Commonly known as:  ADVIL/MOTRIN      Dose:  600 mg  Take 1 tablet (600 mg) by mouth every 6 hours as needed for moderate pain Start after delivery  Quantity:  60 tablet  Refills:  0     senna-docusate 8.6-50 MG tablet  Commonly known as:  SENOKOT-S/PERICOLACE      Dose:  1 tablet  Take 1 tablet by mouth daily Start after  delivery.  Quantity:  100 tablet  Refills:  0        CONTINUE these medicines which have NOT CHANGED      Dose / Directions   ferrous gluconate 324 (38 Fe) MG tablet  Commonly known as:  FERGON  Used for:  Encounter for supervision of normal first pregnancy in second trimester      Dose:  324 mg  Take 1 tablet (324 mg) by mouth daily (with breakfast)  Quantity:  60 tablet  Refills:  1     prenatal multivitamin  plus iron 27-1 MG Tabs      Dose:  1 tablet  Take 1 tablet by mouth daily  Refills:  0        STOP taking    doxylamine 25 MG Tabs tablet  Commonly known as:  Unisom              Where to get your medicines      These medications were sent to [facility] [place], [place] - [address]  [address] [place] [address], [facility] [address]    Phone:  [phone]     acetaminophen 325 MG tablet    ferrous gluconate 324 (38 Fe) MG tablet    ibuprofen 600 MG tablet    senna-docusate 8.6-50 MG tablet               Consultations:     Social work           Brief Admission History:   Liz Agosto is a 24 year old  at 31w3d admitted for  labor.  She was fully dilated with intact membranes on admission. She was given magnesium sulfate for neuroprotection and a course of betamethasone for fetal lung maturity. On HD#2, the decision to proceed toward delivery to reduce the risk of infection and postpartum hemorrhage was made. Amniotomy was performed and pitocin was started for augmentation. She began to have deep variable decelerations. Attempts were made to push, and she pushed with good effort but with minimal descent. Fetal heart tracing was notable for minimal variability and deep variable decelerations to the 80s. The decision was made to proceed with  section. The risks, benefits, and alternatives of  section were discussed with the patient, and she agreed to proceed. In the operating room, there was an additional 30 second deceleration to the 70s with return to 150s baseline,  so decision was made to proceed in a stat fashion.          Intraoperative course   The procedure was uncomplicated.   mL.  See operative report for details.     1. No rectofascial or intraabdominal adhesions.  2. Minimal bloody amniotic fluid  3. Liveborn male infant in vertex presentation. Apgars 5 at 1 minute & 9 at 5 minutes. Weight 2180 g.  4. Arterial cord pH 7.17, base deficit 5.8. Venous cord pH 7.27, base deficit 3.6.  5. Normal uterus, fallopian tubes, and ovaries.        Postpartum Course   The patient's hospital course was unremarkable.  She recovered as anticipated and experienced no post-operative complications. On discharge, her pain was well controlled. Vaginal bleeding is similar to peak menstrual flow.  Voiding without difficulty.  Ambulating well and tolerating a normal diet.  No fever or significant wound drainage. Pumping. Baby in NICU. Bowel function returned  She was discharged on post-partum day #3.    Post-partum hemoglobin:   Hemoglobin   Date Value Ref Range Status   04/25/2020 9.5 (L) 11.7 - 15.7 g/dL Final             Discharge Instructions and Follow-Up:     Discharge diet: Regular   Discharge activity: No lifting greater than 20 lbs, pushing, pulling, or other strenuous activity for 6 weeks. Pelvic rest for 6 weeks including no sexual intercourse, tampons, or douching. No driving until you can slam on the breaks without pain or while on narcotic pain medications.    Discharge follow-up: Follow up with primary OB for routine postpartum visit in 6 weeks   Wound care: Keep incision clean and dry           Discharge Disposition:     Discharged to home      Alexia Casey MD   OB/GYN Resident PGY-3  4/27/2020 8:04 AM

## 2020-04-24 NOTE — PROGRESS NOTES
Progress Note  S: Denied concerns. No lightheadedness.    O:   Patient Vitals for the past 12 hrs:   BP Temp Temp src Resp SpO2   20 105/55 -- -- 16 --   20 (!) 89/49 97.9  F (36.6  C) -- 16 --   20 102/61 -- -- 16 --   20 1950 101/55 98.3  F (36.8  C) Oral 18 --   20 1845 116/61 -- -- -- 99 %   20 1815 100/61 -- -- -- 98 %   20 1745 98/60 -- -- -- 100 %   20 1725 110/69 -- -- -- 97 %   20 1715 102/64 -- -- -- 98 %   20 1615 (!) 87/53 98.6  F (37  C) Oral 16 97 %   20 1545 105/55 -- -- -- 98 %   20 1520 111/55 -- -- -- 100 %   20 1450 97/51 -- -- -- 100 %   20 1349 116/66 -- -- -- 100 %   20 1344 117/62 -- -- -- 100 %   20 1339 102/51 -- -- -- 100 %   20 1335 120/57 -- -- -- --   20 1329 110/60 -- -- -- 100 %   20 1327 109/58 -- -- -- --   20 1325 108/57 -- -- -- --   20 1323 107/57 -- -- -- --   20 1321 97/53 -- -- -- --   20 1315 96/54 98.3  F (36.8  C) Oral -- --   20 1313 107/54 -- -- -- --   20 1300 99/57 -- -- -- 100 %   20 1250 99/54 -- -- -- --   20 1233 98/55 -- -- -- --   20 1228 101/59 -- -- -- --   20 1223 97/55 -- -- -- --   20 1215 -- 99.3  F (37.4  C) Oral -- --     FHT: Baseline 135, minimal to moderate variability, + accelerations, intermittent variable decelerations  [place]: contractions every ~5 minutes    Gen: NAD. A&Ox3.  CV:  RRR, no murmurs  Pulm:  CTAB, no wheezes or crackles. Normal respiratory effort.  Abd:  Soft, non-tender  Ext:  Bicep reflexes 2+ b/l, no clonus b/l, trace LE edema b/l    A/P:  Ms. Liz Agosto is a 24 year old  at 31w2d, admitted for  labor, completely dilated but stable.    1.  Labor:   - Progressed spontaneously, continue to monitor.  - Neuroprotection: IV mag 6g load at 1204. Continue 2g/hr maintenance. No signs or symptoms of magnesium toxicity. Excellent  UO.  - BMZ x1 given 04/23/20 1242.  - GBS unknown. PCN loading dose was given at 1226.  - S/p epidural for pain control.  2. Rh positive  3. GBS pending, ppx running  4. Fetus: Category II FHT, continue EFM and tocometry.   S/p BMZ x1  5. Prenatal hx:  Rubella unknown.    Reshma Justice MD ([email or web address])  ANNIE OBGYN PGY-2  Personal pager: [phone]  4/23/2020

## 2020-04-24 NOTE — PROGRESS NOTES
Called by patient's nurse regarding increased pain on patient's left side compared to right despite repositioning and several PCEA boluses. On exam, pt has decreased sensation to ice to T8 level on right, no discernible level on left. Gave bolus by hand of 4ml of bupivacaine 0.25%, maintained positioning on left side. Recheck 10 minutes later without change in exam. Using sterile technique, catheter was pulled back to 10cm at the skin (previously placed at 11cm with 5.5cm threaded in epidural space per note). Gave additional bolus by hand of 4ml of bupivacaine 0.25%. Recheck 10 minutes later showed decreased sensation to ice to T8 level bilaterally. Patient reports no pain, now resting comfortably. Bedside nurse updated and checking BPs per protocol.     Kenyetta Torrez MD, PGY-3  Anesthesiology resident

## 2020-04-24 NOTE — PLAN OF CARE
Data: Liz Agosto transferred to 7122 via cart at 1800 after visiting infant in NICU  Action: Receiving unit notified of transfer: Yes. Patient and family notified of room change. Report given to Joceline JEFF at 1815. Belongings sent to receiving unit. Accompanied by Registered Nurse. Oriented patient to surroundings. Call light within reach. .  Response: Patient tolerated transfer and is stable.

## 2020-04-24 NOTE — PROGRESS NOTES
Subjective:   Contractions: feeling some pressure   Leakage of fluids: no        Objective:  Patient Vitals for the past 8 hrs:   BP Temp Temp src Resp SpO2   20 1310 122/73 -- -- -- 100 %   20 1246 -- 98.3  F (36.8  C) Axillary -- --   20 1210 103/62 -- -- -- 100 %   20 1140 115/62 -- -- -- 100 %   20 1135 111/68 -- -- -- --   20 1134 -- -- -- -- 100 %   20 1132 109/68 -- -- -- --   20 1125 110/66 98.2  F (36.8  C) Oral -- 100 %   20 1123 107/63 -- -- -- --   20 1119 -- -- -- -- 100 %   20 1115 97/60 -- -- -- --   20 1112 97/55 -- -- -- --   20 1109 -- -- -- -- 100 %   20 1106 111/55 -- -- -- --   20 1100 112/52 -- -- -- --   20 1059 -- -- -- -- 100 %   20 1054 121/60 -- -- -- 100 %   20 1052 111/66 -- -- -- --   20 1050 111/63 -- -- -- --   20 1049 -- -- -- -- 100 %   20 1048 114/65 -- -- -- --   20 1035 -- 98.3  F (36.8  C) Oral -- --   20 0930 101/59 98.5  F (36.9  C) Oral -- 100 %   20 0830 -- 98.2  F (36.8  C) Oral -- --   20 0800 -- -- -- -- 99 %   20 0746 96/51 -- -- -- --   20 0737 -- 98  F (36.7  C) Oral -- --   20 0708 107/55 -- -- -- --   20 0601 100/58 98.9  F (37.2  C) Oral 16 --   20 0531 97/55 -- -- 16 --     Cervix: complete/0 station   Membranes: AROM  clear amniotic fluid    EFM:   125 baseline, moderate variablility, + accels, no decels, Category I  Tocometer: external monitor and frequency q 5-9 minutes    Assessment:/Plan:   Labor: spontaneous  Anticipate  soon. NICU aware.    YAN SMITH MD

## 2020-04-24 NOTE — PROGRESS NOTES
Progress Note  S: Denied concerns.    O:   Patient Vitals for the past 12 hrs:   BP Temp Temp src Resp SpO2   20 0746 96/51 -- -- -- --   20 0737 -- 98  F (36.7  C) Oral -- --   20 0708 107/55 -- -- -- --   20 0601 100/58 98.9  F (37.2  C) Oral 16 --   20 0531 97/55 -- -- 16 --   20 0503 -- 98.9  F (37.2  C) Oral -- --   208 99/56 -- -- 16 --   205 104/58 -- -- 16 --   20 0454 -- -- -- -- 98 %   20 0450 101/59 -- -- 16 --   20 0440 105/60 -- -- 16 --   20 0439 101/56 -- -- 16 98 %   20 0438 98/56 -- -- 16 --   20 0437 92/51 -- -- 16 --   20 0435 95/53 -- -- 16 --   20 0432 94/53 98.6  F (37  C) Oral 16 97 %   20 0425 96/52 -- -- 17 98 %   20 0403 94/55 -- -- 16 --   20 0321 -- 99.2  F (37.3  C) Oral -- --   20 0320 103/57 -- -- 16 --   20 0221 103/56 -- -- 16 --   20 0120 102/57 98.4  F (36.9  C) Oral 16 --   20 0052 108/65 -- -- 16 --   20 0023 99/58 -- -- 16 99 %   200 105/54 -- -- 16 --   200 105/56 98.1  F (36.7  C) Oral 16 --   200 94/51 -- -- 16 --   20 2220 105/55 -- -- 16 --   20 2150 (!) 89/49 97.9  F (36.6  C) -- 16 --   20 102/61 -- -- 16 --   20 1950 101/55 98.3  F (36.8  C) Oral 18 --     FHT: Baseline 135, moderate variability, + accelerations, no decelerations  [place]: contractions every ~5 minutes    Gen: NAD. A&Ox3.  CV:  RRR, no murmurs  Pulm:  CTAB, no wheezes or crackles. Normal respiratory effort.  Abd:  Soft, non-tender  Ext:  Bicep reflexes 2+ b/l, no clonus b/l, trace LE edema b/l    A/P:  Ms. Liz Agosto is a 24 year old  at 31w3d, admitted for  labor, completely dilated but stable.    1.  Labor:   - Progressed spontaneously, continue to monitor.  - Neuroprotection: IV mag 6g load at 1204. Continue 2g/hr maintenance till after her 2nd BMZ dose. No signs or symptoms of  magnesium toxicity. Excellent UO.  - BMZ x1 given 20 1242. Can consider giving her 2nd BMZ dose earlier than 1242. Will talk to NICU today.  - GBS unknown. PCN loading dose was given at 1226. Continue PCN maintenance dose   - S/p epidural for pain control.  2. Rh positive  3. GBS pending, ppx running  4. Fetus: Category I FHT, continue EFM and tocometry.   S/p BMZ x1  5. Prenatal hx:  Rubella unknown.    Reshma Justice MD ([email or web address])  N OBGYN PGY-2  Personal pager: [phone]  20     I assumed care of this 23 y/o  at 31w3d complete and BBOW now s/p betamethasone yesterday 1242 and on magnesium for neuro protection.   .  EFM:   120 baseline, moderate variablility, + accels, no decels, Category I  toco every 2-11 min    Will discuss situation with NICU and plan for AROM and  today.     Maddy Vanegas MD

## 2020-04-24 NOTE — PROVIDER NOTIFICATION
04/23/20 2247   Provider Notification   Provider Name/Title Dr. Justice   Method of Notification At Bedside   Request Evaluate in Person   Notification Reason Other (Comment)  (Magnesium check)

## 2020-04-24 NOTE — PLAN OF CARE
Data: Contraction pattern  : contractions every 5-6 minutes  . Fetal assessment Reactive. Cervix remains completely dilated. Mucous and bloody show present. Membranes remain intact. VSS. Pt afebrile. Epidural was not working effectively to block labor pain on the left side of maternal abdomen.  Interventions: Continuous uterine/fetal heart rate monitoring. Activity level:Bed rest with epidural infusing, and medication measures including  continuous magnesium infusing at 2g/hr and penicillin via PIV Q 4 hours for GBS unknown status. Second dose of betamethasone due at 1242. Epidural adjustment and bolus completed around 0420, pain resolved. Pt wearing sequential compression devices and occasional position changes.  Plan: Continue expectant management. Observe for and notify care provider of SROM, patient feeling pressure to push or signs of fetal/maternal compromise.

## 2020-04-24 NOTE — CONSULTS
Neonatology Antepartum Counseling Consult      I was asked to provide antepartum counseling for Liz Agosto at the request of Maddy Vanegas MD secondary to  labor. Ms. Agosto is currently 31 weeks and has a hx significant for  labor. Betamethasone was administered on 2020. Ms. Agosto, accompanied by her significant other, was counseled on the expected hospital course, potential risks, and outcomes associated with an infant born at approximately 31 weeks gestation. The counseling included: morbidity, mortality, initial delivery room stabilization, respiratory course, lung development, patent ductus arteriosus, hyperbilirubinemia, hemodynamic support, infection, nutrition, growth and development, and long term outcomes. Please feel free to call with any additional questions or concerns.          CHINA Monreal, [facility] 2020 9:15 AM   Nurse Practitioner Service    Intensive Care Unit  [facility]      Floor Time (min): 5  Face to Face Time (min): 20  Total Time (minutes): 25  More than 50% of my time was spent in direct, face to face, antepartum counseling with the above patient.

## 2020-04-24 NOTE — OP NOTE
[facility]  Full Operative Progress Note     Surgery Date:  2020    Surgeon:  Maddy Reeves MD    Assistants:  Petra Richards MD, PGY-3    Pre-op Diagnosis:  1. Intrauterine pregnancy at 31w3d     2.  labor     3. Suspected placental abruption      Post-op Diagnosis:  1. Same      2. Liveborn male infant     Procedure:  Primary low-transverse  section with double layer uterine closure via Pfannenstiel incision    Anesthesia: Epidural    EBL:  600 ml    IVF:  600 mL crystalloid    UOP:  300 mL pink-tinged urine at the end of the case    Drains: Rodriguez Catheter     Specimens:  Placenta, cord blood, cord gases    Complications: None apparent    Indications:   Liz Aogsto is a 24 year old  at 31w3d admitted for  labor.  She was fully dilated with intact membranes on admission. She was given magnesium sulfate for neuroprotection and a course of betamethasone for fetal lung maturity. On HD#2, the decision to proceed toward delivery to reduce the risk of infection and postpartum hemorrhage was made. Amniotomy was performed and pitocin was started for augmentation. She began to have deep variable decelerations. Attempts were made to push, and she pushed with good effort but with minimal descent. Fetal heart tracing was notable for minimal variability and deep variable decelerations to the 80s. The decision was made to proceed with  section. The risks, benefits, and alternatives of  section were discussed with the patient, and she agreed to proceed. In the operating room, there was an additional 30 second deceleration to the 70s with return to 150s baseline, so decision was made to proceed in a stat fashion.    Findings:   1. No rectofascial or intraabdominal adhesions.  2. Minimal bloody amniotic fluid  3. Liveborn male infant in vertex presentation. Apgars 5 at 1 minute & 9 at 5 minutes. Weight 2180 g.  4. Arterial cord pH 7.17, base deficit  5.8. Venous cord pH 7.27, base deficit 3.6.  5. Normal uterus, fallopian tubes, and ovaries.     Procedure Details:   The patient was brought to the OR, where epidural was bolused to an adequate level for surgical anesthesia.  She was placed in the dorsal supine position with a slight leftward tilt. She was prepped and draped in the usual sterile fashion. A surgical time out was performed. A pfannenstiel skin incision was made with the scalpel, and carried down to the underlying fascia with sharp and blunt dissection. The fascia was incised in the midline, and the incision was extended laterally, superiorly, and inferiorly in a blunt stat fashion. The rectus muscles were  in the midline, and the peritoneum was entered bluntly, and the opening was extended with digital pressure. The bladder blade was placed. A transverse hysterotomy was made with the scalpel in the lower uterine segment, and the incision was extended with digital pressure. The infant was noted to be in the right occiput anterior position. The fetal head was impacted, and was brought to the level of the hysterotomy with the assistance of a vaginal hand. The incision was extended by  the right rectus muscles with Matthews scissors. The shoulders delivered easily.  No nuchal cord was noted. The cord was doubly clamped and cut after sixty seconds, and the infant was handed off to the awaiting nursery staff. A segment of cord was cut and passed off. The placenta was delivered with gentle traction on the umbilical cord and uterine massage. The uterus was exteriorized and cleared of all clots and debris. Uterine tone was noted to be firm with pitocin given through the running IV and uterine massage.  The hysterotomy was closed with a running locked suture of 0 Monocryl.  The hysterotomy was then imbricated using an 0 Monocryl suture. The hysterotomy was noted to be hemostatic. The posterior cul-de-sac was irrigated and cleared of all clots  and debris. The uterus was returned to the abdomen. The pericolic gutters were irrigated and cleared of all clots and debris. The hysterotomy was reexamined and noted to be hemostatic. A half sheet of SepraFilm was placed over the hysterotomy. The fascia and rectus muscles were examined and areas of oozing were controlled with electrocautery. The rectus muscle that was taken down was hemostatic. A half sheet of SepraFilm was placed over the rectus muscles. The fascia was closed with a running 0 Vicryl suture. The subcutaneous tissue was irrigated and areas of oozing were controlled with electrocautery. The subcutaneous tissue was less than 2 cm in thickness, and was therefore not closed. The skin was closed with 4-0 Monocryl and covered with a sterile dressing.    All sponge, needle, and instrument counts were correct. The patient tolerated the procedure well, and was transferred to recovery in stable condition. Dr. Avendano was present and scrubbed for the entirety of the procedure.     Petra Richards MD  OBGYN PGY-3  3:10 PM 4/24/2020      I was present and scrubbed for entirety of the surgical case and  agree with note as edited to reflect findings.      YAN SMITH MD

## 2020-04-24 NOTE — PROGRESS NOTES
Progress Note  S: Epidural is wearing off and feeling more pain on her left side. No lightheadedness or SOB.    O:   Patient Vitals for the past 12 hrs:   BP Temp Temp src Resp SpO2   20 0321 -- 99.2  F (37.3  C) Oral -- --   20 0320 103/57 -- -- 16 --   20 0221 103/56 -- -- 16 --   20 0120 102/57 98.4  F (36.9  C) Oral 16 --   20 0052 108/65 -- -- 16 --   20 0023 99/58 -- -- 16 99 %   20 2350 105/54 -- -- 16 --   20 2320 105/56 98.1  F (36.7  C) Oral 16 --   20 2250 94/51 -- -- 16 --   20 2220 105/55 -- -- 16 --   20 2150 (!) 89/49 97.9  F (36.6  C) -- 16 --   20 2122 102/61 -- -- 16 --   20 1950 101/55 98.3  F (36.8  C) Oral 18 --   20 1845 116/61 -- -- -- 99 %   20 1815 100/61 -- -- -- 98 %   20 1745 98/60 -- -- -- 100 %   20 1725 110/69 -- -- -- 97 %   20 1715 102/64 -- -- -- 98 %   20 1615 (!) 87/53 98.6  F (37  C) Oral 16 97 %   20 1545 105/55 -- -- -- 98 %     FHT: Baseline 135, moderate variability, + accelerations, no decelerations  [place]: contractions every ~5 minutes    Gen: NAD. A&Ox3.  CV:  RRR, no murmurs  Pulm:  CTAB, no wheezes or crackles. Normal respiratory effort.  Abd:  Soft, non-tender  Ext:  Bicep reflexes 2+ b/l, no clonus b/l, trace LE edema b/l    A/P:  Ms. Liz Agosto is a 24 year old  at 31w3d, admitted for  labor, completely dilated but stable.    1.  Labor:   - Progressed spontaneously, continue to monitor.  - Neuroprotection: IV mag 6g load at 1204. Continue 2g/hr maintenance. No signs or symptoms of magnesium toxicity. Excellent UO.  - BMZ x1 given 20 1242.  - GBS unknown. PCN loading dose was given at 1226. Continue PCN maintenance dose   - S/p epidural for pain control.  2. Rh positive  3. GBS pending, ppx running  4. Fetus: Category I FHT, continue EFM and tocometry.   S/p BMZ x1  5. Prenatal hx:  Rubella unknown.    Reshma Justice MD  ([email or web address])  MONIQUE ALAN PGY-2  Personal pager: [phone]  04/24/20

## 2020-04-24 NOTE — PLAN OF CARE
Late Entry:  Pt AROM with clear fluid, fht catgory 2 after, minimum variability with recurrent VD. Pt attempted to push for about 10 minutes.  called by Dr Vanegas. Pt consented, prepared and brought back to OR 1.  Male baby delivered and brought to NICU.    OR to PACU Transfer Note  Data: Pt to OB PACU via cart. PIV infusing without complications, orosco with clear yellow urine to gravity, vss , pt does not complain of pain and/or nausea.   Interventions: IV to pump, monitors and alarms on, SCD on.  Response: stable .  Plan: Patient instructed to notify RN for pain or nausea, routine post op cares, pumping initiated. Patient brought to NICU to see baby

## 2020-04-25 LAB — HGB BLD-MCNC: 9.5 G/DL (ref 11.7–15.7)

## 2020-04-25 PROCEDURE — 12000001 ZZH R&B MED SURG/OB UMMC

## 2020-04-25 PROCEDURE — 25000132 ZZH RX MED GY IP 250 OP 250 PS 637: Performed by: STUDENT IN AN ORGANIZED HEALTH CARE EDUCATION/TRAINING PROGRAM

## 2020-04-25 PROCEDURE — 85018 HEMOGLOBIN: CPT | Performed by: STUDENT IN AN ORGANIZED HEALTH CARE EDUCATION/TRAINING PROGRAM

## 2020-04-25 PROCEDURE — 25000128 H RX IP 250 OP 636: Performed by: STUDENT IN AN ORGANIZED HEALTH CARE EDUCATION/TRAINING PROGRAM

## 2020-04-25 PROCEDURE — 36415 COLL VENOUS BLD VENIPUNCTURE: CPT | Performed by: STUDENT IN AN ORGANIZED HEALTH CARE EDUCATION/TRAINING PROGRAM

## 2020-04-25 PROCEDURE — 25000132 ZZH RX MED GY IP 250 OP 250 PS 637: Performed by: ADVANCED PRACTICE MIDWIFE

## 2020-04-25 RX ORDER — AMOXICILLIN 250 MG
1 CAPSULE ORAL DAILY
Qty: 100 TABLET | Refills: 0 | Status: SHIPPED | OUTPATIENT
Start: 2020-04-25

## 2020-04-25 RX ORDER — FERROUS GLUCONATE 324(38)MG
324 TABLET ORAL
Qty: 60 TABLET | Refills: 1 | Status: SHIPPED | OUTPATIENT
Start: 2020-04-25

## 2020-04-25 RX ORDER — ACYCLOVIR 50 MG/G
OINTMENT TOPICAL
Status: DISCONTINUED | OUTPATIENT
Start: 2020-04-25 | End: 2020-04-27 | Stop reason: HOSPADM

## 2020-04-25 RX ORDER — IBUPROFEN 600 MG/1
600 TABLET, FILM COATED ORAL EVERY 6 HOURS PRN
Qty: 60 TABLET | Refills: 0 | Status: SHIPPED | OUTPATIENT
Start: 2020-04-25

## 2020-04-25 RX ORDER — ACETAMINOPHEN 325 MG/1
650 TABLET ORAL EVERY 6 HOURS PRN
Qty: 100 TABLET | Refills: 0 | Status: SHIPPED | OUTPATIENT
Start: 2020-04-25

## 2020-04-25 RX ADMIN — ACETAMINOPHEN 975 MG: 325 TABLET, FILM COATED ORAL at 10:16

## 2020-04-25 RX ADMIN — ACYCLOVIR: 50 OINTMENT TOPICAL at 23:28

## 2020-04-25 RX ADMIN — SIMETHICONE CHEW TAB 80 MG 80 MG: 80 TABLET ORAL at 03:39

## 2020-04-25 RX ADMIN — SENNOSIDES AND DOCUSATE SODIUM 2 TABLET: 8.6; 5 TABLET ORAL at 10:16

## 2020-04-25 RX ADMIN — ACYCLOVIR: 50 OINTMENT TOPICAL at 17:37

## 2020-04-25 RX ADMIN — KETOROLAC TROMETHAMINE 30 MG: 30 INJECTION, SOLUTION INTRAMUSCULAR at 04:20

## 2020-04-25 RX ADMIN — IBUPROFEN 800 MG: 800 TABLET, FILM COATED ORAL at 14:33

## 2020-04-25 RX ADMIN — IBUPROFEN 800 MG: 800 TABLET, FILM COATED ORAL at 20:15

## 2020-04-25 RX ADMIN — ACETAMINOPHEN 975 MG: 325 TABLET, FILM COATED ORAL at 23:28

## 2020-04-25 RX ADMIN — SENNOSIDES AND DOCUSATE SODIUM 2 TABLET: 8.6; 5 TABLET ORAL at 20:15

## 2020-04-25 RX ADMIN — ACYCLOVIR: 50 OINTMENT TOPICAL at 20:15

## 2020-04-25 RX ADMIN — ACETAMINOPHEN 975 MG: 325 TABLET, FILM COATED ORAL at 17:34

## 2020-04-25 RX ADMIN — ACYCLOVIR: 50 OINTMENT TOPICAL at 14:34

## 2020-04-25 RX ADMIN — SIMETHICONE CHEW TAB 80 MG 80 MG: 80 TABLET ORAL at 10:16

## 2020-04-25 RX ADMIN — ACETAMINOPHEN 975 MG: 325 TABLET, FILM COATED ORAL at 03:34

## 2020-04-25 NOTE — PROGRESS NOTES
Post Partum Progress Note  PPD#1    Subjective:  She is resting comfortably in bed this morning. No complaints. Pain is improving and well controlled on current medication regimen. She is tolerating PO intake. Lochia present and appropriate.  She is voiding without difficulty. She is ambulating without dizziness or difficulty.  She has had a bowel movement and is passing flatus.  She denies headache, changes in vision, nausea/vomiting, chest pain, shortness of breath, RUQ pain, or worsening edema.  Plans to breastfeed, she is pumping now for baby in NICU.    Objective:  Patient Vitals for the past 24 hrs:   BP Temp Temp src Pulse Resp SpO2   04/25/20 0700 114/71 98.3  F (36.8  C) Oral 76 14 --   04/25/20 0330 100/64 98.7  F (37.1  C) Oral 70 12 99 %   04/24/20 2340 114/70 98.4  F (36.9  C) Oral 74 14 98 %   04/24/20 2235 113/71 98.5  F (36.9  C) Oral 70 16 98 %   04/24/20 2130 111/64 98  F (36.7  C) Oral 85 16 100 %   04/24/20 2020 113/67 98.5  F (36.9  C) Oral 80 18 98 %   04/24/20 1823 105/65 98.3  F (36.8  C) Oral 83 20 99 %   04/24/20 1712 113/68 -- -- 83 -- 98 %   04/24/20 1655 120/70 -- -- 81 20 96 %   04/24/20 1640 115/69 -- -- 83 20 97 %   04/24/20 1625 115/76 -- -- 86 -- 97 %   04/24/20 1610 111/73 98.3  F (36.8  C) Oral 90 -- 99 %   04/24/20 1555 116/79 -- -- 92 -- --   04/24/20 1540 121/84 -- -- 97 18 100 %   04/24/20 1525 114/76 -- -- 93 -- --   04/24/20 1518 114/76 99.7  F (37.6  C) -- 95 18 97 %   04/24/20 1310 122/73 98.2  F (36.8  C) -- -- -- 100 %   04/24/20 1246 -- 98.3  F (36.8  C) Axillary -- -- --   04/24/20 1210 103/62 -- -- -- -- 100 %   04/24/20 1140 115/62 -- -- -- -- 100 %   04/24/20 1135 111/68 -- -- -- -- --   04/24/20 1134 -- -- -- -- -- 100 %   04/24/20 1132 109/68 -- -- -- -- --   04/24/20 1125 110/66 98.2  F (36.8  C) Oral -- -- 100 %   04/24/20 1123 107/63 -- -- -- -- --   04/24/20 1119 -- -- -- -- -- 100 %   04/24/20 1115 97/60 -- -- -- -- --   04/24/20 1112 97/55 -- -- -- -- --    20 1109 -- -- -- -- -- 100 %   20 1106 111/55 -- -- -- -- --   20 1100 112/52 -- -- -- -- --   20 1059 -- -- -- -- -- 100 %   20 1054 121/60 -- -- -- -- 100 %   20 1052 111/66 -- -- -- -- --   20 1050 111/63 -- -- -- -- --   20 1049 -- -- -- -- -- 100 %   20 1048 114/65 -- -- -- -- --   ]    General: NAD. A&Ox3.  CV: Regular rate, well perfused.   Pulm: Normal respiratory effort.  Abd: Soft, non-tender, non-distended. Fundus is firm and 1 cm below the umbilicus.    Incision: pfannenstiel skin incision is clean, dry, intact with dermabond in place  Ext: trace lower extremity edema bilaterally. No calf tenderness.    Assessment/Plan:  Liz Agosto is a 24 year old  female who is POD#1 s/p pLTCS for placental abruption, currently recovering well.    - Encourage routine post-operative goals including ambulation and incentive spirometry  - PNC: Rh positive. Rubella immune. No intervention indicated.  - Pain: controlled on oral medications  - Heme: Hgb 11.8>>9.5. Vital signs stable, asymptomatic for anemia. Will discharge home with iron.  - GI: continue anti-emetics and stool softeners as needed.  - : s/p orosco, voiding spontaneously.  - Infant: Stable in NICU  - Feeding: Plans on breastfeeding.  - BC: Will discuss.    Discharge to home on POD#2-3.    Petra Richards MD  Ob/Gyn PGY-3  20 8:47 AM    Physician Attestation   I, Ileana Nicole MD, personally examined and evaluated this patient.  I discussed the patient with the resident/fellow and care team, and agree with the assessment and plan of care as documented in the note of 2020.      I personally reviewed vital signs, medications and labs.    Madden findings: Liz Agosto is a 24 year old  at 31w3d POD#1 s/p PLTCS for placental abruption in the setting of  labor and ROM.  Patient overall is recovering well from surgery.  Pain is controlled on PO meds, lochia minimal, tolerating  regular diet, ambulating without dizziness, and she has had a bowel movement.  Post -op Hgb is appropriate for intra-operative blood loss.  Likely discharge to home POD#2-3.    Ileana Nicole MD  Date of Service (when I saw the patient): 04/25/20

## 2020-04-25 NOTE — PROGRESS NOTES
Courtesy visit by WILEY. Liz is feeling sore and overwhelmed, but overall well. She is grateful for her care by OB team and support from CNMs. She has good social support at home, and support from a friend who also had a  birth recently. She is pumping and feeling confident. Treating cold sore on mouth with acyclovir cream, counseled to avoid kissing baby while cold sore is present.     CHINA Peres CNM

## 2020-04-25 NOTE — PROGRESS NOTES
Called and spoke to pt about surgery and what happened. Discussed likely abruption that we decompressed and that's why cat 2 and vaginal bleeding.    She had marginal abruption with vaginal bleeding until 17 weeks. Reports laboring that morning and came in when it got really bad and was 10 cm.   They had intercourse that morning and discussed NOT her fault this happened. Reassured and all questions answered. She reports baby doing well in NICU off Cpap already.    Maddy Vanegas MD

## 2020-04-25 NOTE — PLAN OF CARE
VSS. Afebrile. Up ad mima. Voiding and passing gas.  Ambulating in room. Going to NICU to see baby often. C/o some pain and medicated with relief. Pumping when she can and getting about 5cc from both breasts. FOB here and supportive. Will continue to monitor.

## 2020-04-25 NOTE — PLAN OF CARE
VSS. Postpartum assessment WNL. Pain well controlled with Tylenol and Toradol. Patient pumped 2x on this shift. RN assisted with cleaning pump parts and labeling breastmilk. Patient is excited to go see baby once her hourly vital sign checks are complete at 2330. 2nd bag of Pitocin continues to infuse. Patient is drinking water and UOP adequate, so no maintenance fluids are running. Partner at bedside and supportive.     Support person's temperature taken and WNL at 2000.

## 2020-04-25 NOTE — PROGRESS NOTES
Transferred from L&D to [facility] at about 1810. Oriented to room. Pitocin infusing. FOB here and supportive. Will continue to monitor.

## 2020-04-25 NOTE — LACTATION NOTE
"This note was copied from a baby's chart.  D: Liz states she is normally in good health, takes no medications, and has no history of breast/chest surgery or trauma. Rickie is her first child. She has already started to pump for puddles.   I:  I gave her a folder of introductory materials, reviewed physiology of colostrum and milk production, pumping guidelines, and I gave her a log and encouraged her to use it.  I explained how to access the videos \"Hand Expression\" and \"Maximizing Milk Production\"; as well as other helpful books and websites.  We discussed hands-on pumping techniques and usefulness of a hands-free pumping bra.  Parents are looking into delivery options for getting a bra. We discussed skin to skin holding and how to reach breastfeeding goals.  We talked about medications during breastfeeding.  She verbalized understanding. She has pump coverage through her insurance company which she identifies as through the state.  A:  Mom has information she needs to initiate her supply.   P:  Will continue to provide lactation support.  Flora Mcnally, RNC, IBCLC             "

## 2020-04-25 NOTE — PLAN OF CARE
VSS and postpartum assessment WDL. She is up ad mima, voiding, pain managed with toradol and tylenol. Incision appears to be healing well with no s/s infection. Uterus firm and midline. Scant lochia rubra. No breast or nipple pain; pumping independently. no BM, passing flatus. Support person, FOB, present at bedside; patient goes to visit baby in NICU. Education provided on self-care, plan of care. Continue with plan of care.

## 2020-04-26 PROCEDURE — 25000132 ZZH RX MED GY IP 250 OP 250 PS 637: Performed by: STUDENT IN AN ORGANIZED HEALTH CARE EDUCATION/TRAINING PROGRAM

## 2020-04-26 PROCEDURE — 12000001 ZZH R&B MED SURG/OB UMMC

## 2020-04-26 RX ADMIN — ACETAMINOPHEN 975 MG: 325 TABLET, FILM COATED ORAL at 14:36

## 2020-04-26 RX ADMIN — ACETAMINOPHEN 975 MG: 325 TABLET, FILM COATED ORAL at 07:21

## 2020-04-26 RX ADMIN — IBUPROFEN 800 MG: 800 TABLET, FILM COATED ORAL at 23:00

## 2020-04-26 RX ADMIN — ACETAMINOPHEN 975 MG: 325 TABLET, FILM COATED ORAL at 19:49

## 2020-04-26 RX ADMIN — ACYCLOVIR: 50 OINTMENT TOPICAL at 03:15

## 2020-04-26 RX ADMIN — SENNOSIDES AND DOCUSATE SODIUM 1 TABLET: 8.6; 5 TABLET ORAL at 09:32

## 2020-04-26 RX ADMIN — IBUPROFEN 800 MG: 800 TABLET, FILM COATED ORAL at 09:31

## 2020-04-26 RX ADMIN — IBUPROFEN 800 MG: 800 TABLET, FILM COATED ORAL at 16:01

## 2020-04-26 RX ADMIN — ACYCLOVIR: 50 OINTMENT TOPICAL at 19:49

## 2020-04-26 RX ADMIN — ACYCLOVIR: 50 OINTMENT TOPICAL at 07:22

## 2020-04-26 RX ADMIN — IBUPROFEN 800 MG: 800 TABLET, FILM COATED ORAL at 03:15

## 2020-04-26 NOTE — PLAN OF CARE
VSS. Postpartum assessment WNL. Patient taking tylenol and ibuprofen. Reports incisional pain is well controlled. She is pumping independently for baby in NICU.  left hospital yesterday evening to go home to get some belongings. RN asked him before he left if he had this approved by security, which he said he did. After he left, RN spoke with Charge RN who informed RN that the units, not security, are enforcing visitor restrictions. When he returned to the hospital, RN informed him that RN made a mistake and that visitors for L&D/NFCC patients are not allowed to leave the hospital once they arrive. He was adamant that this was not told to him at any point during his stay and that it was not in any of the policies that he referenced on the [employer] website prior to leaving. He said that lots of people are coming and going and asked if some people are getting different treatment. RN assured him that the policy on our unit is for all visitors. He said he had no plans to leave the hospital again prior to discharge. RN screened him for COVID-19 and took temperature when he returned. His temperature was WNL and he answered no to all screening questions. Patient is anticipated to discharge today.

## 2020-04-26 NOTE — PLAN OF CARE
VSS. Afebrile. Pt feeling tired and in a little bit more pain. Medicated with some relief. Declining oxycodone d/t possible side effects. Up ad mima and going to NICU often. Pumping consistently. FOB here and in NICU. Supportive. Pt will stay until tomorrow. Baby will be transferred to [facility] tomorrow once pt discharges.

## 2020-04-26 NOTE — PROGRESS NOTES
[facility] Obstetrics Post-Op  Section Progress Note     Postop day # 2    SUBJECTIVE:   Feeling tired and weak.  Baby is NICU,  stable.    Bleeding is appropriate.   Breast feeding: starting to pump.           Physical Exam:     Vitals:    20 1400 20 0315 20 0858   BP: 109/67 124/78 112/60 112/72   Pulse: 78 89 74 79   Resp: 16 18 18 16   Temp: 97.8  F (36.6  C) 97.9  F (36.6  C) 98.1  F (36.7  C) 98.2  F (36.8  C)   TempSrc: Oral Oral Oral Oral   SpO2:          Gen:  NAD,   normal respiratory and cardiovascular effort   ABD:  Soft, NT   Incision:  Dry and intact  Uterine fundus is firm, non-tender and at the level of the umbilicus  bilateral LE's: trace edema, nontender    Hemoglobin   Date Value Ref Range Status   2020 9.5 (L) 11.7 - 15.7 g/dL Final   2020 10.4 (L) 11.7 - 15.7 g/dL Final              Assessment and Plan:    POD # 2, s/p primary  section for PTL, suspected abruption  Anemic and symptomatic - will begin oral iron  Routine postop cares   Plan D/C tomorrow.      Saige Willingham MD

## 2020-04-27 ENCOUNTER — TELEPHONE (OUTPATIENT)
Dept: OBGYN | Facility: CLINIC | Age: 25
End: 2020-04-27

## 2020-04-27 VITALS
SYSTOLIC BLOOD PRESSURE: 119 MMHG | OXYGEN SATURATION: 99 % | HEART RATE: 79 BPM | TEMPERATURE: 97.7 F | DIASTOLIC BLOOD PRESSURE: 67 MMHG | RESPIRATION RATE: 16 BRPM

## 2020-04-27 PROCEDURE — 25000132 ZZH RX MED GY IP 250 OP 250 PS 637: Performed by: STUDENT IN AN ORGANIZED HEALTH CARE EDUCATION/TRAINING PROGRAM

## 2020-04-27 RX ORDER — OXYCODONE HYDROCHLORIDE 5 MG/1
5 TABLET ORAL EVERY 4 HOURS PRN
Qty: 4 TABLET | Refills: 0 | Status: SHIPPED | OUTPATIENT
Start: 2020-04-27

## 2020-04-27 RX ORDER — ACYCLOVIR 50 MG/G
OINTMENT TOPICAL
Qty: 30 G | Refills: 0 | Status: SHIPPED | OUTPATIENT
Start: 2020-04-27

## 2020-04-27 RX ADMIN — ACETAMINOPHEN 975 MG: 325 TABLET, FILM COATED ORAL at 02:42

## 2020-04-27 RX ADMIN — ACETAMINOPHEN 975 MG: 325 TABLET, FILM COATED ORAL at 09:02

## 2020-04-27 RX ADMIN — IBUPROFEN 800 MG: 800 TABLET, FILM COATED ORAL at 06:24

## 2020-04-27 NOTE — PROGRESS NOTES
Post Partum Progress Note  PPD#3    Subjective:  She is resting comfortably in bed this morning. She is doing well, ready for discharge. Baby is being transferred to [facility] today. Pain is improving and well controlled on current medication regimen. She is tolerating PO intake. Lochia present and light.  She is voiding without difficulty. She has passed flatus and has had a BM. She is ambulating without dizziness or difficulty.  She denies headache, changes in vision, nausea/vomiting, chest pain, shortness of breath, RUQ pain, or worsening edema.  Plans to breast feed.    Objective:  Vitals:    20 1800 20 2000 20 0238 20 0730   BP: 111/69 122/76 114/70 119/67   Pulse: 89 93 91 79   Resp: 16 16 16 16   Temp: 98.9  F (37.2  C) 98  F (36.7  C) 98.3  F (36.8  C) 97.7  F (36.5  C)   TempSrc: Oral Oral Oral Oral   SpO2:           General: NAD. A&Ox3.  CV: Regular rate, well perfused.   Pulm: Normal respiratory effort.  Abd: Soft, non-tender, non-distended. Fundus is firm and below the umbilicus.    Incision: incision is clean, dry, intact  Ext: Trace lower extremity edema bilaterally. No calf tenderness.    Assessment/Plan:  Liz Agosto is a 24 year old  female who is POD#3 s/p PLTCS for  labor/suspected abruption .    - Encourage routine post-operative goals including ambulation and incentive spirometry  - PNC: Rh positive. Rubella immune. No intervention indicated.  - Pain: controlled on oral medications  - Heme: Hg 11.8> >9.5 Will discharge home with iron.  - GI: continue anti-emetics and stool softeners as needed.  - :Voiding spontaneously .  - Infant: NICU  - Feeding: Plans on breast feeding.  - BC: Plans on mirena IUD    Discharge to home today     Alexia Casey MD   OB/GYN Resident PGY-3  2020 7:59 AM    Patient seen and examined by me, agree with above resident note. Doing well and ok for discharge.  Instructions given.  RTC 6 weeks    BENNY DIAZ  MD

## 2020-04-27 NOTE — TELEPHONE ENCOUNTER
Zovirax 5% not covered on insurance. Abreva OTC 10% is. Is it ok if we substitute? Please advise.    Carolee Cali PharmD  [facility]   [phone]

## 2020-04-27 NOTE — PLAN OF CARE
Data: Vital signs within normal limits. Postpartum checks within normal limits - see flow record. Patient eating and drinking normally. Patient able to empty bladder independently and is up ambulating. No apparent signs of infection. Incision healing well. Patient performing self cares and goes down to the NICU to see baby. Patient is pumping every three to four hours.The patient's significant other left the unit and was made aware of the visitor policy. Therefore, security was informed about not letting the significant other come to back to the unit because the significant other went home for the night.     Action: Patient medicated during the shift for pain and cramping. See MAR. Patient reassessed within 1 hour after each medication and pain was improved - patient stated she was comfortable. Patient education done about pumping, discharge videos, visitor policies during the current pandemic, incision care, and discharge. See flow record.  Response: Positive attachment behaviors observed with infant by going down to the NICU to hold baby and pumps every 3-4 hours.   Plan: Will continue plan of care.

## 2020-04-29 LAB — COPATH REPORT: NORMAL

## 2020-05-24 ENCOUNTER — LACTATION ENCOUNTER (OUTPATIENT)
Age: 25
End: 2020-05-24

## 2020-05-24 NOTE — LACTATION NOTE
"This note was copied from a baby's chart.  Lactation visit per RN and Liz's request to assist with feeding. Liz shared she'd completed 72 hours of protected breastfeeding earlier this week, then working on infant driven feedings with peace Damian. She shared that the first day of protected breastfeeding she was so happy as baby's post-feed weights showed good feeding amounts, but he dropped in subsequent days to minimal intakes. She shared she's gotten discouraged and has questions about when he'll feed well, how to help him to feed well, and if there is anything she can do differently. Offered emotional support.    Liz is pumping and getting good milk volumes. She states, \"I hate pumping\". Reinforced benefits of pumping at least 8 times in 24 hours to maintain good milk supply and offered support on her work to maintain milk supply so well so far. She questioned need to pump as often, so offered information regarding risks of dropping pumping sessions on milk supply and rationale for having an abundant milk supply when beginning to breastfeed a  baby. Liz appreciative of information.    At time of visit, peace Damian was cueing to start breastfeeding. Liz has used a nipple shield with feedings at times but doesn't really like using it. Discussed rationale for using nipple shield with  breastfeeding baby, and Liz ok'd trying again. She'd been given a 20mm shield so changed to a 24mm shield, and peace Damian was able to latch, but took a few sucks and became sleepy. COLETTE assisted to hand express droplets of EBM into shield, but he tired quickly. Changed positions and recommended trying football hold while peace Damian sitting up, but Liz felt more comfortable with cross cradle hold. When changed to other breast, peace Damian did develop a few suck swallow patterns, although they were very irregular. Discussed breast compression to assist with milk transfer as baby develops a more nutritive suck pattern. Baby " tended to hold nipple in mouth, but even with stimulation not sucking often. Post weight with small amount of milk intake.    Long conversation had with Liz regarding  baby behaviors, ebb and flow of good vs poor feedings as baby is able to tolerate feed. Emotional support given. Discussed continuing to use nipple shield. Encouraged to continue with pumping. Made Liz aware Lactation can continue to assist with feedings and encouraged Liz to be present for 2 feeds per day as she's able to allow baby more time to work on breastfeeding. Liz very appreciative of assistance.    Brenda Ramos, RN-C, IBCLC, MNN, PHN, BSN

## 2020-06-02 ENCOUNTER — LACTATION ENCOUNTER (OUTPATIENT)
Age: 25
End: 2020-06-02

## 2020-06-02 NOTE — LACTATION NOTE
"This note was copied from a baby's chart.  Lactation visit per Liz's request to assist with breastfeeding, assess latch, and check in regarding tender nipples. Liz has been working on breastfeeding and has noticed baby Rickie \"bites\" when at breast at times, which is painful. Liz also noticed pumping became increasingly painful within past few days. She changed to 27mm flanges and feels pain eases after first minute or two of pumping, so improved from previous experience with 24mm flanges. Nipples assessed and are reddened but intact. Encouraged using lanolin on flanges with pumping to decrease rubbing, and to use after feedings/pumping.    Marbin Damian was at breast during visit. He had a shallow latch at right breast in cross cradle hold with occasional non-nutritive suck bursts. LC attempted to assist to get a deeper latch, but babe did not develop a nutritive suck pattern. Discussed utilizing nipple shield for feedings, reviewed reasoning for use with  babies to assist with suck/swallow strength and milk transfer. Liz ok to try after explanation. Nipple shield introduced at left breast and baby Rickie was able to latch with lips much wider. Still no nutritive suck pattern. Attempted to hand express drops of EBM into shield. He became sleepy and fatigued.     Offered emotional support to Liz regarding  feeding behaviors, reassured regarding some better feeds, some poor ones. Encouraged to continue to work on breastfeeding, utilize nipple shield, and continue to pump using 27 mm flanges. Liz very appreciative of support and assistance.    Brenda Ramos, RN-C, IBCLC, MNN, PHN, BSN    "

## 2020-12-14 ENCOUNTER — HEALTH MAINTENANCE LETTER (OUTPATIENT)
Age: 25
End: 2020-12-14

## 2021-09-23 NOTE — PROGRESS NOTES
Discussed situation with NICU and will stop magnesium now since >12 hours neuroprotection and then baby will be more perky.  Give betamethasone now (early) and then plan AROM.    Discussed with pt and spouse, no questions. Plan  this afternoon.    EFM:   125 baseline, minimal variability, + accels, variables occasionally, Category II    Will monitor tracing but minimal variability is likely from magnesium. About 1-2 variables noted per hour. Overall tracing is not concerning for hypoxemia.     Maddy Vanegas MD     Negative

## 2021-10-02 ENCOUNTER — HEALTH MAINTENANCE LETTER (OUTPATIENT)
Age: 26
End: 2021-10-02

## 2022-01-22 ENCOUNTER — HEALTH MAINTENANCE LETTER (OUTPATIENT)
Age: 27
End: 2022-01-22

## 2022-07-19 ENCOUNTER — VIRTUAL VISIT (OUTPATIENT)
Dept: PSYCHOLOGY | Facility: CLINIC | Age: 27
End: 2022-07-19
Payer: COMMERCIAL

## 2022-07-19 DIAGNOSIS — F43.20 ADJUSTMENT DISORDER, UNSPECIFIED TYPE: Primary | ICD-10-CM

## 2022-07-19 PROCEDURE — 90834 PSYTX W PT 45 MINUTES: CPT | Mod: 95

## 2022-07-19 ASSESSMENT — COLUMBIA-SUICIDE SEVERITY RATING SCALE - C-SSRS
2. HAVE YOU ACTUALLY HAD ANY THOUGHTS OF KILLING YOURSELF?: NO
1. HAVE YOU WISHED YOU WERE DEAD OR WISHED YOU COULD GO TO SLEEP AND NOT WAKE UP?: NO
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
## 2022-07-27 ENCOUNTER — VIRTUAL VISIT (OUTPATIENT)
Dept: PSYCHOLOGY | Facility: CLINIC | Age: 27
End: 2022-07-27
Payer: COMMERCIAL

## 2022-07-27 DIAGNOSIS — F43.20 ADJUSTMENT DISORDER, UNSPECIFIED TYPE: Primary | ICD-10-CM

## 2022-07-27 PROCEDURE — 90791 PSYCH DIAGNOSTIC EVALUATION: CPT | Mod: 95

## 2022-07-27 NOTE — Clinical Note
I copied and pasted the DA from the extended doc on 7/19. Her second appointment was yesterday 7/27. DA note should be for 7/27? I pasted document to 07/27.

## 2022-07-27 NOTE — Clinical Note
My apologies. I entered the LOS,Diagnosis and cahanged dates and hopefully entered this in the right encounter for 7/28
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
## 2022-08-01 ENCOUNTER — VIRTUAL VISIT (OUTPATIENT)
Dept: PSYCHOLOGY | Facility: CLINIC | Age: 27
End: 2022-08-01
Payer: COMMERCIAL

## 2022-08-01 DIAGNOSIS — F43.20 ADJUSTMENT DISORDER, UNSPECIFIED TYPE: Primary | ICD-10-CM

## 2022-08-01 PROCEDURE — 90837 PSYTX W PT 60 MINUTES: CPT | Mod: 95
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
## 2022-08-10 ENCOUNTER — VIRTUAL VISIT (OUTPATIENT)
Dept: PSYCHOLOGY | Facility: CLINIC | Age: 27
End: 2022-08-10
Payer: COMMERCIAL

## 2022-08-10 DIAGNOSIS — F43.20 ADJUSTMENT DISORDER, UNSPECIFIED TYPE: Primary | ICD-10-CM

## 2022-08-10 PROCEDURE — 90834 PSYTX W PT 45 MINUTES: CPT | Mod: 95
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
## 2022-08-17 ENCOUNTER — VIRTUAL VISIT (OUTPATIENT)
Dept: PSYCHOLOGY | Facility: CLINIC | Age: 27
End: 2022-08-17
Payer: COMMERCIAL

## 2022-08-17 DIAGNOSIS — F43.20 ADJUSTMENT DISORDER, UNSPECIFIED TYPE: Primary | ICD-10-CM

## 2022-08-17 PROCEDURE — 90834 PSYTX W PT 45 MINUTES: CPT | Mod: 95
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
## 2022-08-30 ENCOUNTER — VIRTUAL VISIT (OUTPATIENT)
Dept: PSYCHOLOGY | Facility: CLINIC | Age: 27
End: 2022-08-30
Payer: COMMERCIAL

## 2022-08-30 DIAGNOSIS — F43.20 ADJUSTMENT DISORDER, UNSPECIFIED TYPE: Primary | ICD-10-CM

## 2022-08-30 PROCEDURE — 90837 PSYTX W PT 60 MINUTES: CPT | Mod: 95
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
## 2022-09-03 ENCOUNTER — HEALTH MAINTENANCE LETTER (OUTPATIENT)
Age: 27
End: 2022-09-03

## 2022-09-07 ENCOUNTER — VIRTUAL VISIT (OUTPATIENT)
Dept: PSYCHOLOGY | Facility: CLINIC | Age: 27
End: 2022-09-07
Payer: COMMERCIAL

## 2022-09-07 DIAGNOSIS — F43.20 ADJUSTMENT DISORDER, UNSPECIFIED TYPE: Primary | ICD-10-CM

## 2022-09-07 PROCEDURE — 90837 PSYTX W PT 60 MINUTES: CPT | Mod: 95
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
## 2022-09-14 ENCOUNTER — VIRTUAL VISIT (OUTPATIENT)
Dept: PSYCHOLOGY | Facility: CLINIC | Age: 27
End: 2022-09-14
Payer: COMMERCIAL

## 2022-09-14 DIAGNOSIS — F43.20 ADJUSTMENT DISORDER, UNSPECIFIED TYPE: Primary | ICD-10-CM

## 2022-09-14 PROCEDURE — 90834 PSYTX W PT 45 MINUTES: CPT | Mod: 95
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
## 2022-09-21 ENCOUNTER — VIRTUAL VISIT (OUTPATIENT)
Dept: PSYCHOLOGY | Facility: CLINIC | Age: 27
End: 2022-09-21
Payer: COMMERCIAL

## 2022-09-21 DIAGNOSIS — F43.20 ADJUSTMENT DISORDER, UNSPECIFIED TYPE: Primary | ICD-10-CM

## 2022-09-21 PROCEDURE — 90834 PSYTX W PT 45 MINUTES: CPT | Mod: 95
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
## 2022-09-28 ENCOUNTER — VIRTUAL VISIT (OUTPATIENT)
Dept: PSYCHOLOGY | Facility: CLINIC | Age: 27
End: 2022-09-28
Payer: COMMERCIAL

## 2022-09-28 DIAGNOSIS — F43.20 ADJUSTMENT DISORDER, UNSPECIFIED TYPE: Primary | ICD-10-CM

## 2022-09-28 PROCEDURE — 90834 PSYTX W PT 45 MINUTES: CPT | Mod: 95
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
## 2022-10-05 ENCOUNTER — VIRTUAL VISIT (OUTPATIENT)
Dept: PSYCHOLOGY | Facility: CLINIC | Age: 27
End: 2022-10-05
Payer: COMMERCIAL

## 2022-10-05 DIAGNOSIS — F43.20 ADJUSTMENT DISORDER, UNSPECIFIED TYPE: Primary | ICD-10-CM

## 2022-10-05 PROCEDURE — 90834 PSYTX W PT 45 MINUTES: CPT | Mod: 95
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
## 2022-10-12 ENCOUNTER — VIRTUAL VISIT (OUTPATIENT)
Dept: PSYCHOLOGY | Facility: CLINIC | Age: 27
End: 2022-10-12
Payer: COMMERCIAL

## 2022-10-12 DIAGNOSIS — F43.20 ADJUSTMENT DISORDER, UNSPECIFIED TYPE: Primary | ICD-10-CM

## 2022-10-12 PROCEDURE — 90834 PSYTX W PT 45 MINUTES: CPT | Mod: 95
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
## 2022-10-19 ENCOUNTER — VIRTUAL VISIT (OUTPATIENT)
Dept: PSYCHOLOGY | Facility: CLINIC | Age: 27
End: 2022-10-19
Payer: COMMERCIAL

## 2022-10-19 DIAGNOSIS — F43.20 ADJUSTMENT DISORDER, UNSPECIFIED TYPE: Primary | ICD-10-CM

## 2022-10-19 PROCEDURE — 90834 PSYTX W PT 45 MINUTES: CPT | Mod: 95
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
## 2022-10-26 ENCOUNTER — VIRTUAL VISIT (OUTPATIENT)
Dept: PSYCHOLOGY | Facility: CLINIC | Age: 27
End: 2022-10-26
Payer: COMMERCIAL

## 2022-10-26 DIAGNOSIS — F43.20 ADJUSTMENT DISORDER, UNSPECIFIED TYPE: Primary | ICD-10-CM

## 2022-10-26 PROCEDURE — 90834 PSYTX W PT 45 MINUTES: CPT | Mod: 95
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
## 2022-11-09 ENCOUNTER — VIRTUAL VISIT (OUTPATIENT)
Dept: PSYCHOLOGY | Facility: CLINIC | Age: 27
End: 2022-11-09
Payer: COMMERCIAL

## 2022-11-09 DIAGNOSIS — F43.20 ADJUSTMENT DISORDER, UNSPECIFIED TYPE: Primary | ICD-10-CM

## 2022-11-09 PROCEDURE — 90834 PSYTX W PT 45 MINUTES: CPT | Mod: 95
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
## 2022-11-16 ENCOUNTER — VIRTUAL VISIT (OUTPATIENT)
Dept: PSYCHOLOGY | Facility: CLINIC | Age: 27
End: 2022-11-16
Payer: COMMERCIAL

## 2022-11-16 DIAGNOSIS — F43.20 ADJUSTMENT DISORDER, UNSPECIFIED TYPE: Primary | ICD-10-CM

## 2022-11-16 PROCEDURE — 90834 PSYTX W PT 45 MINUTES: CPT | Mod: 95
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
## 2022-11-30 ENCOUNTER — VIRTUAL VISIT (OUTPATIENT)
Dept: PSYCHOLOGY | Facility: CLINIC | Age: 27
End: 2022-11-30
Payer: COMMERCIAL

## 2022-11-30 DIAGNOSIS — F43.23 ADJUSTMENT DISORDER WITH MIXED ANXIETY AND DEPRESSED MOOD: Primary | ICD-10-CM

## 2022-11-30 PROCEDURE — 90834 PSYTX W PT 45 MINUTES: CPT | Mod: 95
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
## 2022-12-07 ENCOUNTER — VIRTUAL VISIT (OUTPATIENT)
Dept: PSYCHOLOGY | Facility: CLINIC | Age: 27
End: 2022-12-07
Payer: COMMERCIAL

## 2022-12-07 DIAGNOSIS — F43.23 ADJUSTMENT DISORDER WITH MIXED ANXIETY AND DEPRESSED MOOD: Primary | ICD-10-CM

## 2022-12-07 PROCEDURE — 90834 PSYTX W PT 45 MINUTES: CPT | Mod: 95
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
## 2022-12-14 ENCOUNTER — VIRTUAL VISIT (OUTPATIENT)
Dept: PSYCHOLOGY | Facility: CLINIC | Age: 27
End: 2022-12-14
Payer: COMMERCIAL

## 2022-12-14 DIAGNOSIS — F43.23 ADJUSTMENT DISORDER WITH MIXED ANXIETY AND DEPRESSED MOOD: Primary | ICD-10-CM

## 2022-12-14 PROCEDURE — 90834 PSYTX W PT 45 MINUTES: CPT | Mod: 95
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
## 2022-12-22 ENCOUNTER — VIRTUAL VISIT (OUTPATIENT)
Dept: PSYCHOLOGY | Facility: CLINIC | Age: 27
End: 2022-12-22
Payer: COMMERCIAL

## 2022-12-22 DIAGNOSIS — F43.23 ADJUSTMENT DISORDER WITH MIXED ANXIETY AND DEPRESSED MOOD: Primary | ICD-10-CM

## 2022-12-22 PROCEDURE — 90834 PSYTX W PT 45 MINUTES: CPT | Mod: 95
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
## 2023-01-04 ENCOUNTER — VIRTUAL VISIT (OUTPATIENT)
Dept: PSYCHOLOGY | Facility: CLINIC | Age: 28
End: 2023-01-04
Payer: COMMERCIAL

## 2023-01-04 DIAGNOSIS — F43.23 ADJUSTMENT DISORDER WITH MIXED ANXIETY AND DEPRESSED MOOD: Primary | ICD-10-CM

## 2023-01-04 PROCEDURE — 90834 PSYTX W PT 45 MINUTES: CPT | Mod: 95
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
## 2023-01-11 ENCOUNTER — VIRTUAL VISIT (OUTPATIENT)
Dept: PSYCHOLOGY | Facility: CLINIC | Age: 28
End: 2023-01-11
Payer: COMMERCIAL

## 2023-01-11 DIAGNOSIS — F43.23 ADJUSTMENT DISORDER WITH MIXED ANXIETY AND DEPRESSED MOOD: Primary | ICD-10-CM

## 2023-01-11 PROCEDURE — 90834 PSYTX W PT 45 MINUTES: CPT | Mod: 95
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
## 2023-04-23 ENCOUNTER — HEALTH MAINTENANCE LETTER (OUTPATIENT)
Age: 28
End: 2023-04-23

## 2024-04-27 ENCOUNTER — HEALTH MAINTENANCE LETTER (OUTPATIENT)
Age: 29
End: 2024-04-27

## 2025-05-11 ENCOUNTER — HEALTH MAINTENANCE LETTER (OUTPATIENT)
Age: 30
End: 2025-05-11

## (undated) DEVICE — SOL NACL 0.9% IRRIG 1000ML BOTTLE 07138-09

## (undated) DEVICE — GLOVE ESTEEM POWDER FREE SMT 6.0  2D72PT60

## (undated) DEVICE — PACK C-SECTION LF PL15OTA83B

## (undated) DEVICE — STRAP KNEE/BODY 31143004

## (undated) DEVICE — SU VICRYL 0 CT-1 36" J346H

## (undated) DEVICE — CATH TRAY FOLEY 16FR BARDEX W/DRAIN BAG STATLOCK 300316A

## (undated) DEVICE — PREP CHLORAPREP 26ML TINTED ORANGE  260815

## (undated) DEVICE — SOL WATER IRRIG 1000ML BOTTLE 07139-09

## (undated) DEVICE — BASIN SET MAJOR

## (undated) DEVICE — SU MONOCRYL 0 CTB-1 36" YB946

## (undated) DEVICE — GLOVE PROTEXIS BLUE W/NEU-THERA 6.5  2D73EB65

## (undated) DEVICE — ADH SKIN CLOSURE PREMIERPRO EXOFIN 1.0ML 3470

## (undated) DEVICE — BARRIER SEPRAFILM 5X6" SINGLE SHEET 4301-02

## (undated) DEVICE — STOCKING SLEEVE COMPRESSION CALF LG

## (undated) DEVICE — DRAPE SETUP C-SECTION INVISISHIELD 54X90" DYNJE5600

## (undated) DEVICE — SUCTION CANISTER MEDIVAC LINER 1500ML W/LID 65651-515

## (undated) DEVICE — ESU GROUND PAD UNIVERSAL W/O CORD

## (undated) RX ORDER — FENTANYL CITRATE 50 UG/ML
INJECTION, SOLUTION INTRAMUSCULAR; INTRAVENOUS
Status: DISPENSED
Start: 2020-04-24

## (undated) RX ORDER — ONDANSETRON 2 MG/ML
INJECTION INTRAMUSCULAR; INTRAVENOUS
Status: DISPENSED
Start: 2020-04-24

## (undated) RX ORDER — FENTANYL CITRATE 50 UG/ML
INJECTION, SOLUTION INTRAMUSCULAR; INTRAVENOUS
Status: DISPENSED
Start: 2020-04-23

## (undated) RX ORDER — OXYTOCIN/0.9 % SODIUM CHLORIDE 30/500 ML
PLASTIC BAG, INJECTION (ML) INTRAVENOUS
Status: DISPENSED
Start: 2020-04-24

## (undated) RX ORDER — KETOROLAC TROMETHAMINE 30 MG/ML
INJECTION, SOLUTION INTRAMUSCULAR; INTRAVENOUS
Status: DISPENSED
Start: 2020-04-24

## (undated) RX ORDER — MORPHINE SULFATE 1 MG/ML
INJECTION, SOLUTION EPIDURAL; INTRATHECAL; INTRAVENOUS
Status: DISPENSED
Start: 2020-04-24